# Patient Record
Sex: FEMALE | Race: WHITE | NOT HISPANIC OR LATINO | Employment: OTHER | URBAN - METROPOLITAN AREA
[De-identification: names, ages, dates, MRNs, and addresses within clinical notes are randomized per-mention and may not be internally consistent; named-entity substitution may affect disease eponyms.]

---

## 2017-02-21 ENCOUNTER — HOSPITAL ENCOUNTER (EMERGENCY)
Facility: OTHER | Age: 77
Discharge: HOME | End: 2017-02-21
Attending: EMERGENCY MEDICINE
Payer: MEDICARE

## 2017-02-21 VITALS
OXYGEN SATURATION: 96 % | RESPIRATION RATE: 16 BRPM | BODY MASS INDEX: 30.32 KG/M2 | SYSTOLIC BLOOD PRESSURE: 150 MMHG | TEMPERATURE: 97 F | WEIGHT: 182 LBS | HEIGHT: 65 IN | DIASTOLIC BLOOD PRESSURE: 72 MMHG | HEART RATE: 57 BPM

## 2017-02-21 DIAGNOSIS — R53.81 MALAISE: Primary | ICD-10-CM

## 2017-02-21 DIAGNOSIS — R42 LIGHTHEADEDNESS: ICD-10-CM

## 2017-02-21 LAB
ALBUMIN SERPL BCP-MCNC: 3.5 G/DL
ALP SERPL-CCNC: 84 U/L
ALT SERPL W/O P-5'-P-CCNC: 30 U/L
ANION GAP SERPL CALC-SCNC: 11 MMOL/L
AST SERPL-CCNC: 32 U/L
BACTERIA #/AREA URNS HPF: ABNORMAL /HPF
BASOPHILS # BLD AUTO: 0.01 K/UL
BASOPHILS NFR BLD: 0.1 %
BILIRUB SERPL-MCNC: 0.4 MG/DL
BILIRUB UR QL STRIP: NEGATIVE
BUN SERPL-MCNC: 17 MG/DL
CALCIUM SERPL-MCNC: 9.3 MG/DL
CHLORIDE SERPL-SCNC: 95 MMOL/L
CLARITY UR: ABNORMAL
CO2 SERPL-SCNC: 29 MMOL/L
COLOR UR: YELLOW
CREAT SERPL-MCNC: 0.9 MG/DL
DIFFERENTIAL METHOD: ABNORMAL
EOSINOPHIL # BLD AUTO: 0.1 K/UL
EOSINOPHIL NFR BLD: 1.3 %
ERYTHROCYTE [DISTWIDTH] IN BLOOD BY AUTOMATED COUNT: 13.2 %
EST. GFR  (AFRICAN AMERICAN): >60 ML/MIN/1.73 M^2
EST. GFR  (NON AFRICAN AMERICAN): >60 ML/MIN/1.73 M^2
GLUCOSE SERPL-MCNC: 108 MG/DL
GLUCOSE UR QL STRIP: NEGATIVE
HCT VFR BLD AUTO: 41.2 %
HGB BLD-MCNC: 13.8 G/DL
HGB UR QL STRIP: NEGATIVE
KETONES UR QL STRIP: ABNORMAL
LEUKOCYTE ESTERASE UR QL STRIP: ABNORMAL
LYMPHOCYTES # BLD AUTO: 1.9 K/UL
LYMPHOCYTES NFR BLD: 17.6 %
MCH RBC QN AUTO: 32.1 PG
MCHC RBC AUTO-ENTMCNC: 33.5 %
MCV RBC AUTO: 96 FL
MICROSCOPIC COMMENT: ABNORMAL
MONOCYTES # BLD AUTO: 0.9 K/UL
MONOCYTES NFR BLD: 8.1 %
NEUTROPHILS # BLD AUTO: 7.8 K/UL
NEUTROPHILS NFR BLD: 72.4 %
NITRITE UR QL STRIP: NEGATIVE
PH UR STRIP: 7 [PH] (ref 5–8)
PLATELET # BLD AUTO: 228 K/UL
PMV BLD AUTO: 11 FL
POTASSIUM SERPL-SCNC: 3.9 MMOL/L
PROT SERPL-MCNC: 7 G/DL
PROT UR QL STRIP: NEGATIVE
RBC # BLD AUTO: 4.3 M/UL
SODIUM SERPL-SCNC: 135 MMOL/L
SP GR UR STRIP: 1.01 (ref 1–1.03)
SQUAMOUS #/AREA URNS HPF: 13 /HPF
URN SPEC COLLECT METH UR: ABNORMAL
UROBILINOGEN UR STRIP-ACNC: NEGATIVE EU/DL
WBC # BLD AUTO: 10.81 K/UL
WBC #/AREA URNS HPF: 4 /HPF (ref 0–5)

## 2017-02-21 PROCEDURE — 93010 ELECTROCARDIOGRAM REPORT: CPT | Mod: ,,, | Performed by: INTERNAL MEDICINE

## 2017-02-21 PROCEDURE — 99284 EMERGENCY DEPT VISIT MOD MDM: CPT | Mod: 25

## 2017-02-21 PROCEDURE — 96360 HYDRATION IV INFUSION INIT: CPT

## 2017-02-21 PROCEDURE — 80053 COMPREHEN METABOLIC PANEL: CPT

## 2017-02-21 PROCEDURE — 25000003 PHARM REV CODE 250: Performed by: EMERGENCY MEDICINE

## 2017-02-21 PROCEDURE — 85025 COMPLETE CBC W/AUTO DIFF WBC: CPT

## 2017-02-21 PROCEDURE — 81000 URINALYSIS NONAUTO W/SCOPE: CPT

## 2017-02-21 PROCEDURE — 93005 ELECTROCARDIOGRAM TRACING: CPT

## 2017-02-21 RX ORDER — LOPERAMIDE HCL 2 MG
2 TABLET ORAL 4 TIMES DAILY PRN
COMMUNITY

## 2017-02-21 RX ORDER — CITALOPRAM 10 MG/1
10 TABLET ORAL DAILY
Status: ON HOLD | COMMUNITY
End: 2018-11-15

## 2017-02-21 RX ORDER — ROSUVASTATIN CALCIUM 5 MG/1
10 TABLET, COATED ORAL
COMMUNITY

## 2017-02-21 RX ORDER — BUPROPION HYDROCHLORIDE 150 MG/1
150 TABLET, EXTENDED RELEASE ORAL DAILY
COMMUNITY
End: 2021-02-15 | Stop reason: CLARIF

## 2017-02-21 RX ORDER — PROPRANOLOL HYDROCHLORIDE 60 MG/1
60 TABLET ORAL DAILY
COMMUNITY
End: 2018-11-09 | Stop reason: CLARIF

## 2017-02-21 RX ORDER — MONTELUKAST SODIUM 10 MG/1
10 TABLET ORAL NIGHTLY
COMMUNITY

## 2017-02-21 RX ADMIN — SODIUM CHLORIDE 1000 ML: 0.9 INJECTION, SOLUTION INTRAVENOUS at 07:02

## 2017-02-21 NOTE — ED AVS SNAPSHOT
[facility]  [address]  [place] [address]               Emerita Escudero   2017  6:01 PM   ED    Description:  Female : 1940   Department:  [facility]           Your Care was Coordinated By:     Provider Role From To    Leighton Teran II, MD Attending Provider 17 0540 --      Reason for Visit     Abnormal ECG           Diagnoses this Visit        Comments    Malaise    -  Primary     Lightheadedness           ED Disposition     None           To Do List           Follow-up Information     Follow up with Your MD at [facility] In 3 days.      [facility]     [facility] -  Assistance  Registered nurses in the [facility] provide clinical advisement, health education, appointment booking, and other advisory services.  Call for this free service at [phone].             Medications           Message regarding Medications     Verify the changes and/or additions to your medication regime listed below are the same as discussed with your clinician today.  If any of these changes or additions are incorrect, please notify your healthcare provider.        These medications were administered today        Dose Freq    sodium chloride 0.9% bolus 1,000 mL 1,000 mL Once    Sig: Inject 1,000 mLs into the vein once.    Class: Normal    Route: Intravenous           Verify that the below list of medications is an accurate representation of the medications you are currently taking.  If none reported, the list may be blank. If incorrect, please contact your healthcare provider. Carry this list with you in case of emergency.           Current Medications     buPROPion (WELLBUTRIN SR) 150 MG TBSR 12 hr tablet Take 150 mg by mouth once daily.    citalopram (CELEXA) 10 MG tablet Take 10 mg by mouth once daily.    loperamide (IMODIUM A-D) 2 mg Tab Take 2 mg by mouth 4 (four) times daily as needed.    montelukast  "(SINGULAIR) 10 mg tablet Take 10 mg by mouth every evening.    propranolol (INDERAL) 60 MG tablet Take 60 mg by mouth once daily.    rosuvastatin (CRESTOR) 5 MG tablet Take 5 mg by mouth once daily. 1/2 tablet for 5 days a week, 1 tab twice a week    THYROID,PORK (ARMOUR THYROID ORAL) Take by mouth once daily.           Clinical Reference Information           Your Vitals Were     BP Pulse Temp Resp Height Weight    178/98 (BP Location: Left arm, Patient Position: Sitting) 66 97.3 °F (36.3 °C) (Oral) 16 5' 5" (1.651 m) 82.6 kg (182 lb)    SpO2 BMI             96% 30.29 kg/m2         Allergies as of 2/21/2017     No Known Allergies      Immunizations Administered on Date of Encounter - 2/21/2017     None      ED Micro, Lab, POCT     Start Ordered       Status Ordering Provider    02/21/17 1822 02/21/17 1821  Urinalysis - Clean Catch  STAT      Final result     02/21/17 1821 02/21/17 1821  Urinalysis Microscopic  Once      Final result     02/21/17 1818 02/21/17 1821  CBC auto differential  STAT      Final result     02/21/17 1818 02/21/17 1821  Comprehensive metabolic panel  STAT      Final result       ED Imaging Orders     None      Discharge References/Attachments     DIZZINESS, UNCERTAIN CAUSE (ENGLISH)      [facility] Sign-Up     Activating your [facility] account is as easy as 1-2-3!     1) Visit [email or web address], select Sign Up Now, enter this activation code and your date of birth, then select Next.  [email or web address]  Expires: 4/7/2017  8:22 PM      2) Create a username and password to use when you visit [facility] in the future and select a security question in case you lose your password and select Next.    3) Enter your e-mail address and click Sign Up!    Additional Information  If you have questions, please e-mail [email or web address] or call [phone] to talk to our [facility] staff. Remember, [facility] is NOT to be used for urgent needs. For medical emergencies, dial 911.          [facility] complies with applicable Federal civil rights laws and does not discriminate on the basis of race, color, national origin, age, disability, or sex.        Language Assistance Services     ATTENTION: Language assistance services are available, free of charge. Please call [phone].      ATENCIÓN: Si habla español, tiene a daugherty disposición servicios gratuitos de asistencia lingüística. Llame al [phone].     CHÚ Ý: N?u b?n nói Ti?ng Vi?t, có các d?ch v? h? tr? ngôn ng? mi?n phí dành cho b?n. G?i s? [phone].

## 2017-02-22 NOTE — ED PROVIDER NOTES
"Encounter Date: 2/21/2017    SCRIBE #1 NOTE: I, Sommer Romero, am scribing for, and in the presence of,  Dr. Teran. I have scribed the entire note.       History     Chief Complaint   Patient presents with    Abnormal ECG     Pt states," Dr. High sent me to have another EKG done, she said something didn't octaviano rigth on it". Pt reprots + fatigue. Pt denies chest pain, SOB, N/V/D.      Review of patient's allergies indicates:  No Known Allergies  HPI Comments: Time seen by provider: 6:12 PM    This is a 76 y.o. female who presents with complaint of light headedness. She reports onset of symptoms was several days ago. The patient notes prior to onset of symptoms she was feeling bad with cold symptoms for about 2 weeks. She states her doctor prescribed antibiotics and prednisone. The patient notes she completed the Z-pack and finished the steroids 2 days ago. She states she was still feeling bad and went to see her doctor today.  The patient reports she had an EKG which her doctor noted was abnormal. She states the doctor wanted her to come to ED for evaluation. As per spouse the patient was noted to have elevated pressure with lying down and decreased pressure with standing. The patient denies any associated chest pain, palpitations, SOB, nausea, vomiting, fever or chills.     The history is provided by the patient.     History reviewed. No pertinent past medical history.  No past medical history pertinent negatives.  Past Surgical History   Procedure Laterality Date    Cholecystectomy      Tonsillectomy       History reviewed. No pertinent family history.  Social History   Substance Use Topics    Smoking status: Never Smoker    Smokeless tobacco: None    Alcohol use Yes      Comment: socially     Review of Systems   Constitutional: Negative for chills and fever.   HENT: Negative for congestion and sore throat.    Eyes: Negative for redness and visual disturbance.   Respiratory: Negative for cough and " shortness of breath.    Cardiovascular: Negative for chest pain and palpitations.   Gastrointestinal: Negative for abdominal pain, diarrhea, nausea and vomiting.   Genitourinary: Negative for dysuria.   Musculoskeletal: Negative for back pain.   Skin: Negative for rash.   Neurological: Positive for light-headedness. Negative for weakness and headaches.   Psychiatric/Behavioral: Negative for confusion.       Physical Exam   Initial Vitals   BP Pulse Resp Temp SpO2   02/21/17 1613 02/21/17 1613 02/21/17 1613 02/21/17 1613 02/21/17 1613   178/98 66 16 97.3 °F (36.3 °C) 96 %     Physical Exam    Nursing note and vitals reviewed.  Constitutional: She appears well-developed and well-nourished. She is not diaphoretic. No distress.   HENT:   Head: Normocephalic and atraumatic.   Right Ear: External ear normal.   Left Ear: External ear normal.   Dry mucus membranes   Eyes: EOM are normal.   No pallor or icterus   Neck: Normal range of motion. Neck supple.   Cardiovascular: Normal rate, regular rhythm and normal heart sounds. Exam reveals no gallop and no friction rub.    No murmur heard.  Pulmonary/Chest: Breath sounds normal. She has no wheezes. She has no rhonchi. She has no rales.   Abdominal: Soft. Bowel sounds are normal. There is no tenderness. There is no rebound and no guarding.   Musculoskeletal: Normal range of motion. She exhibits no edema or tenderness.   Lymphadenopathy:     She has no cervical adenopathy.   Neurological: She is alert and oriented to person, place, and time. She has normal strength.   Skin: Skin is warm and dry. No rash noted.         ED Course   Procedures  Labs Reviewed   CBC W/ AUTO DIFFERENTIAL - Abnormal; Notable for the following:        Result Value    MCH 32.1 (*)     Gran # 7.8 (*)     Lymph% 17.6 (*)     All other components within normal limits   COMPREHENSIVE METABOLIC PANEL - Abnormal; Notable for the following:     Sodium 135 (*)     All other components within normal limits    URINALYSIS - Abnormal; Notable for the following:     Appearance, UA Hazy (*)     Ketones, UA Trace (*)     Leukocytes, UA Trace (*)     All other components within normal limits   URINALYSIS MICROSCOPIC - Abnormal; Notable for the following:     Bacteria, UA Few (*)     All other components within normal limits     EKG Readings: (Independently Interpreted)   EKG Reading (6:14 PM): Sinus rhythm with a rate of 65. No ST or T wave changes. No STEMI        Medical Decision Making:   Independently Interpreted Test(s):   I have ordered and independently interpreted EKG Reading(s) - see prior notes  Clinical Tests:   Lab Tests: Ordered and Reviewed  Medical Tests: Reviewed and Ordered    Additional MDM:   EKG: I have independently interpreted EKG(s) - see notes.          Scribe Attestation:   Scribe #1: I performed the above scribed service and the documentation accurately describes the services I performed. I attest to the accuracy of the note.    Attending Attestation:           Physician Attestation for Scribe:  Physician Attestation Statement for Scribe #1: I, Dr. Teran, reviewed documentation, as scribed by Sommer Romero in my presence, and it is both accurate and complete.           Patient presents referred from her doctor's office due to ongoing malaise, lightheadedness, after recent treatment for bronchitis.  Patient finished a Z-Rainer and prednisone.  She complains any evidence of occasional lightheadedness with standing, thirst and decreased urination.  On exam other than mildly dry mucous membranes she appeared well.  There is also some question of EKG abnormality at the referring office however reviewed that shows motion artifact and repeat EKG here does not show any acute changes laboratory studies unremarkable without significant dehydration.  Patient feels better after fluids.  No evidence of UTI.  Her lungs are clear and she is afebrile I do not think she needs repeat chest x-ray.  Recommend adequate  hydration at home.  Stable for discharge.  Follow-up with primary care, especially if symptoms persist.          ED Course     Clinical Impression:     1. Malaise    2. Lightheadedness                Leighton Teran II, MD  02/21/17 2028

## 2017-02-22 NOTE — ED TRIAGE NOTES
Pt states she was sent to ER by primary care because of abnormal EKG.  Pt states she was clammy in the office.  Pt's  reports pt's BP dropped when she stood up.

## 2018-10-26 NOTE — H&P
Subjective:     Status post prior left Shackelford  Replacement.  Did well until recently.  Progressive arthritic complaints.  Admitted for conversion to left knee replacement    There are no active problems to display for this patient.    No past medical history on file.   Past Surgical History:   Procedure Laterality Date    CHOLECYSTECTOMY      TONSILLECTOMY        No medications prior to admission.     Review of patient's allergies indicates:  No Known Allergies   Social History     Tobacco Use    Smoking status: Never Smoker   Substance Use Topics    Alcohol use: Yes     Comment: socially      No family history on file.   Review of Systems  Pertinent items are noted in HPI.    Objective:     No data found.  Heart regular lungs clear crepitance tenderness left knee good stability    Imaging Review  Prior Shackelford replacement with degenerative changes lateral compartment    Assessment:     There are no hospital problems to display for this patient.      Plan:     The various methods of treatment have been discussed with the patient and family.   After consideration of risks, benefits and other options for treatment, the patient has consented to surgical interventions (Significant risks discussed).  Questions were answered and Pre-op teaching was done by me.

## 2018-11-07 ENCOUNTER — TELEPHONE (OUTPATIENT)
Dept: MEDSURG UNIT | Facility: OTHER | Age: 78
End: 2018-11-07

## 2018-11-09 ENCOUNTER — ANESTHESIA EVENT (OUTPATIENT)
Dept: SURGERY | Facility: OTHER | Age: 78
DRG: 467 | End: 2018-11-09
Payer: MEDICARE

## 2018-11-09 ENCOUNTER — HOSPITAL ENCOUNTER (OUTPATIENT)
Dept: PREADMISSION TESTING | Facility: OTHER | Age: 78
Discharge: HOME | End: 2018-11-09
Attending: ORTHOPAEDIC SURGERY
Payer: MEDICARE

## 2018-11-09 VITALS
HEIGHT: 64 IN | HEART RATE: 65 BPM | SYSTOLIC BLOOD PRESSURE: 180 MMHG | OXYGEN SATURATION: 97 % | DIASTOLIC BLOOD PRESSURE: 86 MMHG | WEIGHT: 193 LBS | TEMPERATURE: 98 F | BODY MASS INDEX: 32.95 KG/M2

## 2018-11-09 LAB
BACTERIA #/AREA URNS HPF: ABNORMAL /HPF
BILIRUB UR QL STRIP: NEGATIVE
CLARITY UR: ABNORMAL
COLOR UR: YELLOW
GLUCOSE UR QL STRIP: NEGATIVE
HGB UR QL STRIP: NEGATIVE
KETONES UR QL STRIP: NEGATIVE
LEUKOCYTE ESTERASE UR QL STRIP: ABNORMAL
MICROSCOPIC COMMENT: ABNORMAL
NITRITE UR QL STRIP: NEGATIVE
PH UR STRIP: 7 [PH] (ref 5–8)
PROT UR QL STRIP: NEGATIVE
SP GR UR STRIP: 1.01 (ref 1–1.03)
SQUAMOUS #/AREA URNS HPF: 3 /HPF
URN SPEC COLLECT METH UR: ABNORMAL
UROBILINOGEN UR STRIP-ACNC: NEGATIVE EU/DL
WBC #/AREA URNS HPF: 5 /HPF (ref 0–5)

## 2018-11-09 PROCEDURE — 81000 URINALYSIS NONAUTO W/SCOPE: CPT

## 2018-11-09 RX ORDER — VALSARTAN AND HYDROCHLOROTHIAZIDE 160; 25 MG/1; MG/1
1 TABLET ORAL DAILY
Status: ON HOLD | COMMUNITY
End: 2021-02-19 | Stop reason: HOSPADM

## 2018-11-09 RX ORDER — ESZOPICLONE 1 MG/1
3 TABLET, FILM COATED ORAL NIGHTLY PRN
COMMUNITY

## 2018-11-09 RX ORDER — SODIUM CHLORIDE, SODIUM LACTATE, POTASSIUM CHLORIDE, CALCIUM CHLORIDE 600; 310; 30; 20 MG/100ML; MG/100ML; MG/100ML; MG/100ML
INJECTION, SOLUTION INTRAVENOUS CONTINUOUS
Status: CANCELLED | OUTPATIENT
Start: 2018-11-09

## 2018-11-09 RX ORDER — FLUTICASONE PROPIONATE AND SALMETEROL 250; 50 UG/1; UG/1
1 POWDER RESPIRATORY (INHALATION) 2 TIMES DAILY
COMMUNITY

## 2018-11-09 RX ORDER — LIDOCAINE HYDROCHLORIDE 10 MG/ML
0.5 INJECTION, SOLUTION EPIDURAL; INFILTRATION; INTRACAUDAL; PERINEURAL ONCE
Status: CANCELLED | OUTPATIENT
Start: 2018-11-09 | End: 2018-11-09

## 2018-11-09 RX ORDER — METOPROLOL SUCCINATE 25 MG/1
25 TABLET, EXTENDED RELEASE ORAL NIGHTLY
COMMUNITY
End: 2021-02-15 | Stop reason: CLARIF

## 2018-11-09 RX ORDER — CHOLECALCIFEROL (VITAMIN D3) 25 MCG
1000 TABLET ORAL DAILY
COMMUNITY

## 2018-11-09 RX ORDER — FLUCONAZOLE 100 MG/1
100 TABLET ORAL DAILY
Status: ON HOLD | COMMUNITY
End: 2018-11-15

## 2018-11-09 NOTE — PRE ADMISSION SCREENING
"Dr. Hurley, anesthesia, request copies of stress test and echo for patient's MD in [place].  Patient states she will obtain and fax to our office.  Dr. Hurley saw outside labs, EKG, brought in by patient.  Patient states she cannot give urine specimen secondary to "shy bladder"  Patient will collect at home and bring in.  "

## 2018-11-09 NOTE — NURSING
Total Joint Replacement Questionnaire     Emerita Escudero participated in Joint Class Nov 9    1. Have you ever had a Joint Replacement?   [x]Yes  [] No    2. How many stairs/steps do you have to enter your home? 2 to get into bed  When going up, on what side is the railing?  [] Left  [] Right  [] Bilateral  [x] None    3. Do you own any Durable Medical Equipment?   [] Rolling Walker  [] Standard Walker  [] Rollator  [x] Cane  [] Crutches  [x] Bed Side Commode  [] Hip Kit  [] Tub Transfer Bench  [] Shower Chair     4. Have you used a Home Health Company before?   [x] Yes  [] No  If Yes, Name of Company: Family  Would you like to use this company again?   [x] Yes  [] No  [x] Would you like to use your physician's preference?  [x] Yes  [] No    5. Have you arranged for someone to help you, for at least for 3 days, when you are discharged from the hospital?  [x] Yes  [] No  If Yes, Name(s) of person assisting: spouse      Marilia Herndon  11/9/2018

## 2018-11-09 NOTE — ANESTHESIA PREPROCEDURE EVALUATION
11/09/2018  Emerita Escudero is a 78 y.o., female.    Anesthesia Evaluation    I have reviewed the Patient Summary Reports.    I have reviewed the Nursing Notes.   I have reviewed the Medications.     Review of Systems  Anesthesia Hx:  Denies Family Hx of Anesthesia complications.  Personal Hx of Anesthesia complications Slow To Awaken/Delayed Emergence (remote hx, recent anesthetics ok)   Social:  Non-Smoker    Hematology/Oncology:  Hematology Normal   Oncology Normal     EENT/Dental:   chronic allergic rhinitis   Cardiovascular:   Hypertension Valvular problems/Murmurs, AS hyperlipidemia ECG has been reviewed. Recent stress echo,    Pulmonary:   Asthma (controlled advair/singulair, rare rescue inhaler use) Recent URI, resolved    Renal/:  Renal/ Normal     Hepatic/GI:  Hepatic/GI Normal Diflucan for thrush (developed after antibx for URI)   Musculoskeletal:  Spine Disorders: cervical and lumbar Chronic Pain    Neurological:  Neurology Normal Essential tremor on metoprolo   Endocrine:   Hypothyroidism    Dermatological:  Skin Normal    Psych:  Psychiatric Normal           Physical Exam  General:  Well nourished    Airway/Jaw/Neck:  Airway Findings: Mouth Opening: Normal Mallampati: II  TM Distance: Normal, at least 6 cm  Jaw/Neck Findings:  Neck ROM: Extension Decreased, Mod., Extension Painful      Dental:  Dental Findings: In tact, molar caps        Mental Status:  Mental Status Findings:  Cooperative, Alert and Oriented         Anesthesia Plan  Type of Anesthesia, risks & benefits discussed:  Anesthesia Type:  spinal  Patient's Preference:   Intra-op Monitoring Plan: standard ASA monitors  Intra-op Monitoring Plan Comments:   Post Op Pain Control Plan: per primary service following discharge from PACU  Post Op Pain Control Plan Comments:   Induction:    Beta Blocker:         Informed Consent:  Patient understands risks and agrees with Anesthesia plan.  Questions answered. Anesthesia consent signed with patient.  ASA Score: 2     Day of Surgery Review of History & Physical:    H&P update referred to the surgeon.     Anesthesia Plan Notes: Pt just moved here from [place], full w/u, labs, and eval on paper chart    Pt to check with Dr. Xiao re current tx for thrush        Ready For Surgery From Anesthesia Perspective.

## 2018-11-09 NOTE — PRE ADMISSION SCREENING
Pt reports taking Diflucan for thrush.  States last dose will be day before scheduled surgery for TKA, Dr. Xiao, on 11/15/18.  Spoke with Danita, 's assistant, regarding same.  Dr. Xiao called patient and spoke with her.

## 2018-11-15 ENCOUNTER — ANESTHESIA (OUTPATIENT)
Dept: SURGERY | Facility: OTHER | Age: 78
DRG: 467 | End: 2018-11-15
Payer: MEDICARE

## 2018-11-15 ENCOUNTER — HOSPITAL ENCOUNTER (INPATIENT)
Facility: OTHER | Age: 78
LOS: 1 days | Discharge: HOME HEALTH | DRG: 467 | End: 2018-11-16
Attending: ORTHOPAEDIC SURGERY | Admitting: ORTHOPAEDIC SURGERY
Payer: MEDICARE

## 2018-11-15 DIAGNOSIS — T84.093A FAILED TOTAL LEFT KNEE REPLACEMENT: ICD-10-CM

## 2018-11-15 PROCEDURE — 97530 THERAPEUTIC ACTIVITIES: CPT

## 2018-11-15 PROCEDURE — 63600175 PHARM REV CODE 636 W HCPCS: Performed by: ORTHOPAEDIC SURGERY

## 2018-11-15 PROCEDURE — 25000003 PHARM REV CODE 250: Performed by: ANESTHESIOLOGY

## 2018-11-15 PROCEDURE — 36000710: Performed by: ORTHOPAEDIC SURGERY

## 2018-11-15 PROCEDURE — G8978 MOBILITY CURRENT STATUS: HCPCS | Mod: CI

## 2018-11-15 PROCEDURE — 71000039 HC RECOVERY, EACH ADD'L HOUR: Performed by: ORTHOPAEDIC SURGERY

## 2018-11-15 PROCEDURE — 71000033 HC RECOVERY, INTIAL HOUR: Performed by: ORTHOPAEDIC SURGERY

## 2018-11-15 PROCEDURE — 25000003 PHARM REV CODE 250: Performed by: ORTHOPAEDIC SURGERY

## 2018-11-15 PROCEDURE — 63600175 PHARM REV CODE 636 W HCPCS: Performed by: NURSE ANESTHETIST, CERTIFIED REGISTERED

## 2018-11-15 PROCEDURE — 94761 N-INVAS EAR/PLS OXIMETRY MLT: CPT

## 2018-11-15 PROCEDURE — G8979 MOBILITY GOAL STATUS: HCPCS | Mod: CJ

## 2018-11-15 PROCEDURE — C1713 ANCHOR/SCREW BN/BN,TIS/BN: HCPCS | Performed by: ORTHOPAEDIC SURGERY

## 2018-11-15 PROCEDURE — 37000008 HC ANESTHESIA 1ST 15 MINUTES: Performed by: ORTHOPAEDIC SURGERY

## 2018-11-15 PROCEDURE — C9290 INJ, BUPIVACAINE LIPOSOME: HCPCS | Performed by: ORTHOPAEDIC SURGERY

## 2018-11-15 PROCEDURE — 27201423 OPTIME MED/SURG SUP & DEVICES STERILE SUPPLY: Performed by: ORTHOPAEDIC SURGERY

## 2018-11-15 PROCEDURE — 97110 THERAPEUTIC EXERCISES: CPT

## 2018-11-15 PROCEDURE — 0SRD0J9 REPLACEMENT OF LEFT KNEE JOINT WITH SYNTHETIC SUBSTITUTE, CEMENTED, OPEN APPROACH: ICD-10-PCS | Performed by: ORTHOPAEDIC SURGERY

## 2018-11-15 PROCEDURE — 36000711: Performed by: ORTHOPAEDIC SURGERY

## 2018-11-15 PROCEDURE — 25000003 PHARM REV CODE 250: Performed by: NURSE ANESTHETIST, CERTIFIED REGISTERED

## 2018-11-15 PROCEDURE — 0SPD0JZ REMOVAL OF SYNTHETIC SUBSTITUTE FROM LEFT KNEE JOINT, OPEN APPROACH: ICD-10-PCS | Performed by: ORTHOPAEDIC SURGERY

## 2018-11-15 PROCEDURE — C1776 JOINT DEVICE (IMPLANTABLE): HCPCS | Performed by: ORTHOPAEDIC SURGERY

## 2018-11-15 PROCEDURE — 11000001 HC ACUTE MED/SURG PRIVATE ROOM

## 2018-11-15 PROCEDURE — 99900035 HC TECH TIME PER 15 MIN (STAT)

## 2018-11-15 PROCEDURE — 97116 GAIT TRAINING THERAPY: CPT

## 2018-11-15 PROCEDURE — 63600175 PHARM REV CODE 636 W HCPCS: Performed by: SPECIALIST

## 2018-11-15 PROCEDURE — 37000009 HC ANESTHESIA EA ADD 15 MINS: Performed by: ORTHOPAEDIC SURGERY

## 2018-11-15 PROCEDURE — 97161 PT EVAL LOW COMPLEX 20 MIN: CPT

## 2018-11-15 PROCEDURE — 94799 UNLISTED PULMONARY SVC/PX: CPT

## 2018-11-15 DEVICE — IMPLANTABLE DEVICE
Type: IMPLANTABLE DEVICE | Site: KNEE | Status: FUNCTIONAL
Brand: VANGUARD® KNEE SYSTEM

## 2018-11-15 DEVICE — CEMENT REFOBACIN BCR 1X40: Type: IMPLANTABLE DEVICE | Site: KNEE | Status: FUNCTIONAL

## 2018-11-15 DEVICE — IMPLANTABLE DEVICE
Type: IMPLANTABLE DEVICE | Site: KNEE | Status: FUNCTIONAL
Brand: VANGUARD KNEE SYSTEM

## 2018-11-15 DEVICE — IMPLANTABLE DEVICE
Type: IMPLANTABLE DEVICE | Site: KNEE | Status: FUNCTIONAL
Brand: BIOMET® KNEE SYSTEM

## 2018-11-15 RX ORDER — LIDOCAINE HYDROCHLORIDE 10 MG/ML
0.5 INJECTION, SOLUTION EPIDURAL; INFILTRATION; INTRACAUDAL; PERINEURAL ONCE
Status: DISCONTINUED | OUTPATIENT
Start: 2018-11-15 | End: 2018-11-15 | Stop reason: HOSPADM

## 2018-11-15 RX ORDER — BUPROPION HYDROCHLORIDE 150 MG/1
150 TABLET, EXTENDED RELEASE ORAL DAILY
Status: DISCONTINUED | OUTPATIENT
Start: 2018-11-15 | End: 2018-11-16 | Stop reason: HOSPADM

## 2018-11-15 RX ORDER — THYROID 30 MG/1
120 TABLET ORAL DAILY
Status: DISCONTINUED | OUTPATIENT
Start: 2018-11-15 | End: 2018-11-16 | Stop reason: HOSPADM

## 2018-11-15 RX ORDER — HYDROCHLOROTHIAZIDE 25 MG/1
25 TABLET ORAL DAILY
Status: DISCONTINUED | OUTPATIENT
Start: 2018-11-15 | End: 2018-11-16 | Stop reason: HOSPADM

## 2018-11-15 RX ORDER — FAMOTIDINE 20 MG/1
20 TABLET, FILM COATED ORAL 2 TIMES DAILY
Status: DISCONTINUED | OUTPATIENT
Start: 2018-11-15 | End: 2018-11-16 | Stop reason: HOSPADM

## 2018-11-15 RX ORDER — VALSARTAN AND HYDROCHLOROTHIAZIDE 160; 25 MG/1; MG/1
1 TABLET ORAL DAILY
Status: DISCONTINUED | OUTPATIENT
Start: 2018-11-15 | End: 2018-11-15

## 2018-11-15 RX ORDER — HYDROCODONE BITARTRATE AND ACETAMINOPHEN 5; 325 MG/1; MG/1
1 TABLET ORAL EVERY 4 HOURS PRN
Status: DISCONTINUED | OUTPATIENT
Start: 2018-11-15 | End: 2018-11-16 | Stop reason: HOSPADM

## 2018-11-15 RX ORDER — MUPIROCIN 20 MG/G
OINTMENT TOPICAL
Status: DISCONTINUED | OUTPATIENT
Start: 2018-11-15 | End: 2018-11-15

## 2018-11-15 RX ORDER — HYDROMORPHONE HYDROCHLORIDE 2 MG/ML
0.4 INJECTION, SOLUTION INTRAMUSCULAR; INTRAVENOUS; SUBCUTANEOUS EVERY 5 MIN PRN
Status: DISCONTINUED | OUTPATIENT
Start: 2018-11-15 | End: 2018-11-15 | Stop reason: HOSPADM

## 2018-11-15 RX ORDER — SODIUM CHLORIDE 0.9 % (FLUSH) 0.9 %
5 SYRINGE (ML) INJECTION
Status: DISCONTINUED | OUTPATIENT
Start: 2018-11-15 | End: 2018-11-16 | Stop reason: HOSPADM

## 2018-11-15 RX ORDER — VALSARTAN 80 MG/1
160 TABLET ORAL DAILY
Status: DISCONTINUED | OUTPATIENT
Start: 2018-11-15 | End: 2018-11-16 | Stop reason: HOSPADM

## 2018-11-15 RX ORDER — MORPHINE SULFATE 4 MG/ML
4 INJECTION, SOLUTION INTRAMUSCULAR; INTRAVENOUS
Status: DISCONTINUED | OUTPATIENT
Start: 2018-11-15 | End: 2018-11-16 | Stop reason: HOSPADM

## 2018-11-15 RX ORDER — FENTANYL CITRATE 50 UG/ML
25 INJECTION, SOLUTION INTRAMUSCULAR; INTRAVENOUS EVERY 5 MIN PRN
Status: DISCONTINUED | OUTPATIENT
Start: 2018-11-15 | End: 2018-11-15 | Stop reason: HOSPADM

## 2018-11-15 RX ORDER — LIDOCAINE HCL/PF 100 MG/5ML
SYRINGE (ML) INTRAVENOUS
Status: DISCONTINUED | OUTPATIENT
Start: 2018-11-15 | End: 2018-11-15

## 2018-11-15 RX ORDER — TRANEXAMIC ACID 100 MG/ML
INJECTION, SOLUTION INTRAVENOUS
Status: DISCONTINUED | OUTPATIENT
Start: 2018-11-15 | End: 2018-11-15 | Stop reason: HOSPADM

## 2018-11-15 RX ORDER — DEXAMETHASONE SODIUM PHOSPHATE 4 MG/ML
INJECTION, SOLUTION INTRA-ARTICULAR; INTRALESIONAL; INTRAMUSCULAR; INTRAVENOUS; SOFT TISSUE
Status: DISCONTINUED | OUTPATIENT
Start: 2018-11-15 | End: 2018-11-15

## 2018-11-15 RX ORDER — HYDROCODONE BITARTRATE AND ACETAMINOPHEN 10; 325 MG/1; MG/1
1 TABLET ORAL EVERY 4 HOURS PRN
Status: DISCONTINUED | OUTPATIENT
Start: 2018-11-15 | End: 2018-11-16 | Stop reason: HOSPADM

## 2018-11-15 RX ORDER — FLUTICASONE FUROATE AND VILANTEROL 100; 25 UG/1; UG/1
1 POWDER RESPIRATORY (INHALATION) DAILY
Status: DISCONTINUED | OUTPATIENT
Start: 2018-11-15 | End: 2018-11-16 | Stop reason: HOSPADM

## 2018-11-15 RX ORDER — DEXTROSE MONOHYDRATE AND SODIUM CHLORIDE 5; .9 G/100ML; G/100ML
INJECTION, SOLUTION INTRAVENOUS CONTINUOUS
Status: DISCONTINUED | OUTPATIENT
Start: 2018-11-15 | End: 2018-11-16 | Stop reason: HOSPADM

## 2018-11-15 RX ORDER — BUPIVACAINE HCL/EPINEPHRINE 0.25-.0005
VIAL (ML) INJECTION
Status: DISCONTINUED | OUTPATIENT
Start: 2018-11-15 | End: 2018-11-15 | Stop reason: HOSPADM

## 2018-11-15 RX ORDER — OXYCODONE HYDROCHLORIDE 5 MG/1
5 TABLET ORAL
Status: DISCONTINUED | OUTPATIENT
Start: 2018-11-15 | End: 2018-11-15 | Stop reason: HOSPADM

## 2018-11-15 RX ORDER — METOPROLOL SUCCINATE 25 MG/1
25 TABLET, EXTENDED RELEASE ORAL NIGHTLY
Status: DISCONTINUED | OUTPATIENT
Start: 2018-11-15 | End: 2018-11-16 | Stop reason: HOSPADM

## 2018-11-15 RX ORDER — MIDAZOLAM HYDROCHLORIDE 1 MG/ML
INJECTION INTRAMUSCULAR; INTRAVENOUS
Status: DISCONTINUED | OUTPATIENT
Start: 2018-11-15 | End: 2018-11-15

## 2018-11-15 RX ORDER — CEFAZOLIN SODIUM 1 G/50ML
2 SOLUTION INTRAVENOUS
Status: COMPLETED | OUTPATIENT
Start: 2018-11-15 | End: 2018-11-15

## 2018-11-15 RX ORDER — ONDANSETRON 2 MG/ML
INJECTION INTRAMUSCULAR; INTRAVENOUS
Status: DISCONTINUED | OUTPATIENT
Start: 2018-11-15 | End: 2018-11-15

## 2018-11-15 RX ORDER — ROPIVACAINE HYDROCHLORIDE 5 MG/ML
INJECTION, SOLUTION EPIDURAL; INFILTRATION; PERINEURAL
Status: COMPLETED | OUTPATIENT
Start: 2018-11-15 | End: 2018-11-15

## 2018-11-15 RX ORDER — ONDANSETRON 8 MG/1
8 TABLET, ORALLY DISINTEGRATING ORAL EVERY 8 HOURS PRN
Status: DISCONTINUED | OUTPATIENT
Start: 2018-11-15 | End: 2018-11-16 | Stop reason: HOSPADM

## 2018-11-15 RX ORDER — SODIUM CHLORIDE 0.9 % (FLUSH) 0.9 %
3 SYRINGE (ML) INJECTION
Status: DISCONTINUED | OUTPATIENT
Start: 2018-11-15 | End: 2018-11-16 | Stop reason: HOSPADM

## 2018-11-15 RX ORDER — MUPIROCIN 20 MG/G
1 OINTMENT TOPICAL 2 TIMES DAILY
Status: DISCONTINUED | OUTPATIENT
Start: 2018-11-15 | End: 2018-11-16 | Stop reason: HOSPADM

## 2018-11-15 RX ORDER — MONTELUKAST SODIUM 10 MG/1
10 TABLET ORAL NIGHTLY
Status: DISCONTINUED | OUTPATIENT
Start: 2018-11-15 | End: 2018-11-16 | Stop reason: HOSPADM

## 2018-11-15 RX ORDER — ONDANSETRON 2 MG/ML
4 INJECTION INTRAMUSCULAR; INTRAVENOUS DAILY PRN
Status: DISCONTINUED | OUTPATIENT
Start: 2018-11-15 | End: 2018-11-15 | Stop reason: HOSPADM

## 2018-11-15 RX ORDER — ROSUVASTATIN CALCIUM 10 MG/1
10 TABLET, COATED ORAL NIGHTLY
Status: DISCONTINUED | OUTPATIENT
Start: 2018-11-15 | End: 2018-11-15

## 2018-11-15 RX ORDER — RAMELTEON 8 MG/1
8 TABLET ORAL NIGHTLY PRN
Status: DISCONTINUED | OUTPATIENT
Start: 2018-11-15 | End: 2018-11-16 | Stop reason: HOSPADM

## 2018-11-15 RX ORDER — CELECOXIB 200 MG/1
200 CAPSULE ORAL 2 TIMES DAILY
Status: DISCONTINUED | OUTPATIENT
Start: 2018-11-15 | End: 2018-11-16 | Stop reason: HOSPADM

## 2018-11-15 RX ORDER — CEFAZOLIN SODIUM 2 G/50ML
2 SOLUTION INTRAVENOUS
Status: COMPLETED | OUTPATIENT
Start: 2018-11-15 | End: 2018-11-16

## 2018-11-15 RX ORDER — POLYETHYLENE GLYCOL 3350 17 G/17G
17 POWDER, FOR SOLUTION ORAL DAILY
Status: DISCONTINUED | OUTPATIENT
Start: 2018-11-15 | End: 2018-11-16 | Stop reason: HOSPADM

## 2018-11-15 RX ORDER — SODIUM CHLORIDE 0.9 % (FLUSH) 0.9 %
3 SYRINGE (ML) INJECTION
Status: ACTIVE | OUTPATIENT
Start: 2018-11-15

## 2018-11-15 RX ORDER — SODIUM CHLORIDE, SODIUM LACTATE, POTASSIUM CHLORIDE, CALCIUM CHLORIDE 600; 310; 30; 20 MG/100ML; MG/100ML; MG/100ML; MG/100ML
INJECTION, SOLUTION INTRAVENOUS CONTINUOUS
Status: DISCONTINUED | OUTPATIENT
Start: 2018-11-15 | End: 2018-11-15

## 2018-11-15 RX ORDER — PROPOFOL 10 MG/ML
VIAL (ML) INTRAVENOUS CONTINUOUS PRN
Status: DISCONTINUED | OUTPATIENT
Start: 2018-11-15 | End: 2018-11-15

## 2018-11-15 RX ORDER — MEPERIDINE HYDROCHLORIDE 50 MG/ML
12.5 INJECTION INTRAMUSCULAR; INTRAVENOUS; SUBCUTANEOUS ONCE AS NEEDED
Status: DISCONTINUED | OUTPATIENT
Start: 2018-11-15 | End: 2018-11-15 | Stop reason: HOSPADM

## 2018-11-15 RX ORDER — CLONIDINE HYDROCHLORIDE 0.1 MG/1
0.1 TABLET ORAL EVERY 6 HOURS PRN
Status: DISCONTINUED | OUTPATIENT
Start: 2018-11-15 | End: 2018-11-16 | Stop reason: HOSPADM

## 2018-11-15 RX ORDER — NAPROXEN SODIUM 220 MG/1
81 TABLET, FILM COATED ORAL 2 TIMES DAILY
Status: DISCONTINUED | OUTPATIENT
Start: 2018-11-15 | End: 2018-11-16 | Stop reason: HOSPADM

## 2018-11-15 RX ORDER — ACETAMINOPHEN 10 MG/ML
INJECTION, SOLUTION INTRAVENOUS
Status: DISCONTINUED | OUTPATIENT
Start: 2018-11-15 | End: 2018-11-15

## 2018-11-15 RX ORDER — EPHEDRINE SULFATE 50 MG/ML
INJECTION, SOLUTION INTRAVENOUS
Status: DISCONTINUED | OUTPATIENT
Start: 2018-11-15 | End: 2018-11-15

## 2018-11-15 RX ORDER — PROPOFOL 10 MG/ML
VIAL (ML) INTRAVENOUS
Status: DISCONTINUED | OUTPATIENT
Start: 2018-11-15 | End: 2018-11-15

## 2018-11-15 RX ADMIN — PROPOFOL 80 MCG/KG/MIN: 10 INJECTION, EMULSION INTRAVENOUS at 07:11

## 2018-11-15 RX ADMIN — HYDROCODONE BITARTRATE AND ACETAMINOPHEN 1 TABLET: 10; 325 TABLET ORAL at 09:11

## 2018-11-15 RX ADMIN — LIDOCAINE HYDROCHLORIDE 100 MG: 20 INJECTION, SOLUTION INTRAVENOUS at 07:11

## 2018-11-15 RX ADMIN — METOPROLOL SUCCINATE 25 MG: 25 TABLET, EXTENDED RELEASE ORAL at 09:11

## 2018-11-15 RX ADMIN — ASPIRIN 81 MG CHEWABLE TABLET 81 MG: 81 TABLET CHEWABLE at 12:11

## 2018-11-15 RX ADMIN — VALSARTAN 160 MG: 80 TABLET, FILM COATED ORAL at 12:11

## 2018-11-15 RX ADMIN — EPHEDRINE SULFATE 10 MG: 50 INJECTION INTRAMUSCULAR; INTRAVENOUS; SUBCUTANEOUS at 07:11

## 2018-11-15 RX ADMIN — MONTELUKAST SODIUM 10 MG: 10 TABLET, FILM COATED ORAL at 09:11

## 2018-11-15 RX ADMIN — ONDANSETRON 8 MG: 8 TABLET, ORALLY DISINTEGRATING ORAL at 11:11

## 2018-11-15 RX ADMIN — PROPOFOL 40 MG: 10 INJECTION, EMULSION INTRAVENOUS at 07:11

## 2018-11-15 RX ADMIN — DEXTROSE AND SODIUM CHLORIDE: 5; .9 INJECTION, SOLUTION INTRAVENOUS at 10:11

## 2018-11-15 RX ADMIN — CEFAZOLIN SODIUM 2 G: 1 SOLUTION INTRAVENOUS at 07:11

## 2018-11-15 RX ADMIN — MUPIROCIN 1 G: 20 OINTMENT TOPICAL at 12:11

## 2018-11-15 RX ADMIN — DEXAMETHASONE SODIUM PHOSPHATE 8 MG: 4 INJECTION, SOLUTION INTRAMUSCULAR; INTRAVENOUS at 07:11

## 2018-11-15 RX ADMIN — CELECOXIB 200 MG: 200 CAPSULE ORAL at 12:11

## 2018-11-15 RX ADMIN — MUPIROCIN 1 G: 20 OINTMENT TOPICAL at 09:11

## 2018-11-15 RX ADMIN — EPHEDRINE SULFATE 10 MG: 50 INJECTION INTRAMUSCULAR; INTRAVENOUS; SUBCUTANEOUS at 06:11

## 2018-11-15 RX ADMIN — CELECOXIB 200 MG: 200 CAPSULE ORAL at 09:11

## 2018-11-15 RX ADMIN — ONDANSETRON 4 MG: 2 INJECTION INTRAMUSCULAR; INTRAVENOUS at 07:11

## 2018-11-15 RX ADMIN — FAMOTIDINE 20 MG: 20 TABLET, FILM COATED ORAL at 09:11

## 2018-11-15 RX ADMIN — SODIUM CHLORIDE, SODIUM LACTATE, POTASSIUM CHLORIDE, AND CALCIUM CHLORIDE: 600; 310; 30; 20 INJECTION, SOLUTION INTRAVENOUS at 06:11

## 2018-11-15 RX ADMIN — HYDROCODONE BITARTRATE AND ACETAMINOPHEN 1 TABLET: 10; 325 TABLET ORAL at 02:11

## 2018-11-15 RX ADMIN — HYDROCODONE BITARTRATE AND ACETAMINOPHEN 1 TABLET: 10; 325 TABLET ORAL at 05:11

## 2018-11-15 RX ADMIN — RAMELTEON 8 MG: 8 TABLET, FILM COATED ORAL at 09:11

## 2018-11-15 RX ADMIN — HYDROCHLOROTHIAZIDE 25 MG: 25 TABLET ORAL at 12:11

## 2018-11-15 RX ADMIN — MUPIROCIN: 20 OINTMENT TOPICAL at 06:11

## 2018-11-15 RX ADMIN — MIDAZOLAM HYDROCHLORIDE 2 MG: 1 INJECTION, SOLUTION INTRAMUSCULAR; INTRAVENOUS at 06:11

## 2018-11-15 RX ADMIN — ACETAMINOPHEN 1000 MG: 10 INJECTION, SOLUTION INTRAVENOUS at 06:11

## 2018-11-15 RX ADMIN — ROPIVACAINE HYDROCHLORIDE 3 ML: 5 INJECTION, SOLUTION EPIDURAL; INFILTRATION; PERINEURAL at 06:11

## 2018-11-15 RX ADMIN — FAMOTIDINE 20 MG: 20 TABLET, FILM COATED ORAL at 12:11

## 2018-11-15 RX ADMIN — SODIUM CHLORIDE, SODIUM LACTATE, POTASSIUM CHLORIDE, AND CALCIUM CHLORIDE: 600; 310; 30; 20 INJECTION, SOLUTION INTRAVENOUS at 07:11

## 2018-11-15 RX ADMIN — CEFAZOLIN SODIUM 2 G: 2 SOLUTION INTRAVENOUS at 03:11

## 2018-11-15 RX ADMIN — MORPHINE SULFATE 4 MG: 4 INJECTION, SOLUTION INTRAMUSCULAR; INTRAVENOUS at 03:11

## 2018-11-15 RX ADMIN — ASPIRIN 81 MG CHEWABLE TABLET 81 MG: 81 TABLET CHEWABLE at 09:11

## 2018-11-15 RX ADMIN — HYDROCODONE BITARTRATE AND ACETAMINOPHEN 1 TABLET: 5; 325 TABLET ORAL at 11:11

## 2018-11-15 NOTE — PT/OT/SLP PROGRESS
Occupational Therapy  Not Seen    Emerita Escudero   MRN: [id number]     Patient not seen for Occupational Therapy today due to ( ) departmental protocol for elective surgery patients.    Patient with Other mechanical complication of internal orthopedic device, unspecified orthopedic device type, initial encounter [T84.005O], s/p Procedure(s):  ARTHROPLASTY, KNEE 11/15/2018 who will be seen for Occupational Therapy evaluation POD#1.    Lyndsey Ragsdale OTR/L   11/15/2018 '

## 2018-11-15 NOTE — OP NOTE
[facility]  Surgery Department  Operative Note    SUMMARY     Date of Procedure: 11/15/2018     Procedure: Procedure(s) (LRB):  ARTHROPLASTY, KNEE (Left) conversion from failed Egan    Surgeon(s) and Role:     * Landon Xiao MD - Primary    Assisting Surgeon: None    Pre-Operative Diagnosis: Other mechanical complication of internal orthopedic device, unspecified orthopedic device type, initial encounter [T84.498A]    Post-Operative Diagnosis: Post-Op Diagnosis Codes:     * Other mechanical complication of internal orthopedic device, unspecified orthopedic device type, initial encounter [T84.760B]    Anesthesia:  Spinal    Technical Procedures Used:  Patient was brought to the operating room underwent spinal anesthesia without difficulty.  The left lower extremity was prepped and draped in usual fashion tourniquet applied 250 mm of mercury after Esmarch.  Using an extension of the prior incision sharp dissection was the pain down to the extensor mechanism a standard medial parapatellar arthrotomy was performed medial fat pad was removed and a medial release is performed in order to gain access to the tibia had to remove the box for.  Using osteotomes the tibial insert was removed.  Then the tibial base plate was removed.  It was well fixed.  Then attention was turned to the femur.  Again with the osteotomes the femoral side was removed.  Really did not lose much bone. So we compressed the head to a total knee.  It should be noted that she had significant amount of wear on the lateral condyle lateral plateau as well as the patella.  That was the mode of failure.  The anterior and posterior cruciate ligaments removed no lateral meniscus remained.  A tibial cut was made perpendicular to the shaft of the tibia sizing was a 71.  Attention was then turned to the femur with intramedullary guide a 5 degree provisional cut was performed.  Extension gap was about 12 on that.  That was perfect.   Sizing was a 67.  So with the tensor 1st in flexion anterior-posterior cuts performed. Bertha cuts performed.  Flexion extension gaps felt symmetric at 12.  Notch cut was performed.  With a floating trial showed 0-130 degrees range of motion rotation was marked tibia was punched prepatellar thickness 27 post patellar thickness 26 with a 34 patella.  The appropriate punch opening using good cement technique all components were cemented.  Antibiotic cement was used.  Excess cement was removed.  Pulse lavage irrigation was used. The small defect on the distal femur as well as the medial tibial plateau was filled with cement.  Again it was small.  Checked the 12 insert again. Again excellent range of motion stability.  Final 12 mm insert placed.  1.  Vicryl was used in a parapatellar arthrotomy closed in flexion post closure 0-130 degrees range of motion to 0 Vicryl subcutaneously staples in the skin. Exparel tranexamic acid and Marcaine were instilled in the soft tissues. She was placed in bulky soft dressing surgically 63 min tolerated procedure well brought to recovery in stable fashion    Description of the Findings of the Procedure:  Progression of arthritis lateral compartment failed [place]    Significant Surgical Tasks Conducted by the Assistant(s), if Applicable:  None    Complications: No    Estimated Blood Loss (EBL): * No values recorded between 11/15/2018  7:23 AM and 11/15/2018  8:34 AM *           Implants:   Implant Name Type Inv. Item Serial No.  Lot No. LRB No. Used   CEMENT REFOBACIN BCR 1X40 - [id number]  CEMENT REFOBACIN BCR 1X40  [employer] [id number] Left 2   COMP FEM POST STBL 65MM L - [id number]  COMP FEM POST STBL 65MM L  [employer] [id number] Left 1   TIBIAL PLATE     [employer] [id number] Left 1   PATELLA [id number] 34X8.5MM - [id number]  PATELLA [id number] 34X8.5MM  [employer] [id number] Left 1   TIBIAL INSRT VANGRD 12X71/75MM - [id number]  TIBIAL INSRT VANGRD 12X71/75MM  [employer]  [id number] Left 1       Specimens:   Specimen (12h ago, onward)    None                  Condition: Good    Disposition: PACU - hemodynamically stable.    Attestation: I was present and scrubbed for the entire procedure.

## 2018-11-15 NOTE — PT/OT/SLP EVAL
Physical Therapy Evaluation + Treatment    Patient Name:  Emerita Escudero   MRN:  [id number]    Recommendations:     Discharge Recommendations:  home health PT, home health OT, home with home health   Discharge Equipment Recommendations: 3-in-1 commode   Barriers to discharge: None    Assessment:     Emerita Escudero is a 78 y.o. female admitted with a medical diagnosis of Failed total left knee replacement.  She presents with the following impairments/functional limitations:  weakness, impaired functional mobilty, gait instability, impaired balance, pain, decreased ROM, decreased lower extremity function, orthopedic precautions. These impairments inhibit the patient from independently and safely participating in desired functional tasks such as transfers, ambulating within home, cooking, cleaning, painting, driving, and community ambulation.     PT evaluation complete, goals established. Patient tolerated evaluation fairly well but with significant pain noted with mobility tasks. Patient requires mod verbal cues for hand placement during bed mobility. Min A for sit <> stand while using RW. Poor eccentric control of quads upon sit from standing. Ambulated ~ 45' with RW + chair follow requiring CGA but requested to sit secondary to significant pain. Patient unwilling to ambulate further during first session. Patient did tolerate therapeutic exercises fairly well, and benefits from demonstrations. Patient would benefit from HH upon discharge in order to optimize functional mobility outcomes and maximize patient safety.    Rehab Prognosis:  Good/Excellent; patient would benefit from acute skilled PT services to address these deficits and reach maximum level of function.      Recent Surgery: Procedure(s) (LRB):  ARTHROPLASTY, KNEE (Left) Day of Surgery    Plan:     During this hospitalization, patient to be seen BID to address the above listed problems via gait training, therapeutic activities, therapeutic  "exercises  · Plan of Care Expires:  12/15/18   Plan of Care Reviewed with: patient, spouse    Subjective     Communicated with VERONICA Womack prior to session.  Patient found supine in bed upon PT entry to room, agreeable to evaluation.      Chief Complaint: Pain; dizziness upon sit to stand  Patient comments/goals: Agreeable to gait training in hallway but inquired about her clothes. Patient stated, " No, No, I really can't walk any further."    Pain/Comfort:  · Pain Rating 1: 8/10  · Location - Side 1: Left  · Location - Orientation 1: generalized  · Location 1: knee  · Pain Addressed 1: Nurse notified, Distraction, Cessation of Activity  · Pain Rating Post-Intervention 1: 6/10    Patients cultural, spiritual, Yarsanism conflicts given the current situation: none stated     Living Environment:  · Lives in [facility] with elevator assess; one step to get into elevator  · Has "step up shower" and needs to take a step to get into shower  · Lives with  who will provide   care  · Retired; enjoys painting and taking walks with her   · Mod I prior with ambulation 2/ using cane for stability purposes   · Bed is 34 inches high and requires step stool to get into   · Patient has the following equipment: walker, rolling, grab bar, cane, straight.     Objective:     Patient found with: cryotherapy, CPM, FCD     General Precautions: Standard, fall   Orthopedic Precautions:LLE weight bearing as tolerated   Braces: N/A     · Cognition:   · Patient is oriented to name, , date, place, situation.  · Pt follows approximately 100% of one step commands.    · Mood: Pleasant and cooperative. Joking throughout session.   · Musculoskeletal:  · Posture: Protective guarding surgical joint  · LE ROM/Strength: 5/5 bilateral hip flexion, nonsurgical knee extension, bilateral ankle dorsiflexion. AROM surgical knee difficult to accurately assess with large bulky dressing, although knee flexion grossly 0>90 degrees. Surgical knee " strength grossly 5/5 knee flexion and 3-/5 knee extension. Pain with LLE MMT of DF  · Neuromuscular:  · Sensation: Intact to light touch bilateral LEs. Pt denied paresthesias.   · Coordination/Tone/Reflexes: No impairments identified with functional mobility. No formal testing performed.   · Balance: CGA for dynamic standing with bilateral UE support.   · Visual-vestibular: No impairments identified with functional mobility. No formal testing performed.  · Integument:  Visible skin intact and surgical extremity dressing clean and dry.  · Cardiopulmonary:  · Vital signs:  · BP: 162/74  · HR: 71  · Edema: Mild surgical extremity.      Functional Mobility:  · Bed Mobility: HOB elevated to approx 30 degrees; verbal cues provided and noted use of handrails during bed mobility   · Scooting: stand by assistance  · Supine to Sit: stand by assistance  · Transfers: demonstration provided prior to t/f for proper use of RW including hand and feet placement   · Sit <> Stand:  minimum assistance with rolling walker  · 2x during session  · Poor eccentric control of quads upon sit   · Gait: PT demonstrated proper gait including heel contact, toe off, positioning of body in relation to RW, WB precautions, proper turning techniques regarding surgical precautions   · 45' with RW, CGA; Chair follow + IV pole  · Decreased marian  · Poor heel contact and toe off on LLE  · Decreased step length on RLE secondary to pain during stance on LLE  · Truncal flexion  · Balance:   · sitting balance: SBA  · Standing: CGA when using RW    AM-PAC 6 CLICK MOBILITY  Total Score:20       Therapeutic Activities and Exercises:  · See TA and Gait training above  · Exercises  · Pt performed supine therapeutic exercises including ankle pumps, quad sets, glute sets, SLR, heel slides, Hip ABD x 10 reps with verbal and tactile cues.     Patient left up in chair with all lines intact, call button in reach, RN notified and RN and   present.    GOALS:    Multidisciplinary Problems     Physical Therapy Goals        Problem: Physical Therapy Goal    Goal Priority Disciplines Outcome Goal Variances Interventions   Physical Therapy Goal     PT, PT/OT Ongoing (interventions implemented as appropriate)     Description:  Goals to be met by: 18    Patient will increase functional independence with mobility by performin. Supine to sit with supervision, no verbal cues for hand placement  2. Sit to supine with supervision, no cues for hand placement   3. Sit<>stand transfer with supervision using rolling walker with no verbal cues for hands/feet placement.   4. Gait > 150 feet with SBA using rolling walker with adequate L knee flexion during swing phase.   5. Ascend/descend 4 inch step with no handrails with CGA with or without AD.                      History:     Past Medical History:   Diagnosis Date    Asthma     Hypertension     Lipids abnormal     Seasonal allergies     Thyroid disease        Past Surgical History:   Procedure Laterality Date    CHOLECYSTECTOMY      COSMETIC SURGERY      face lift    DILATION AND CURETTAGE OF UTERUS      EYE SURGERY      cataract    partial knee replacement Left     TONSILLECTOMY         Clinical Decision Making:     History  Co-morbidities and personal factors that may impact the plan of care Examination  Body Structures and Functions, activity limitations and participation restrictions that may impact the plan of care Clinical Presentation   Decision Making/ Complexity Score   Co-morbidities:   [] Time since onset of injury / illness / exacerbation  [] Status of current condition  []Patient's cognitive status and safety concerns    [] Multiple Medical Problems (see med hx)  Personal Factors:   [] Patient's age  [] Prior Level of function   [] Patient's home situation (environment and family support)  [] Patient's level of motivation  [] Expected progression of patient      HISTORY:(criteria)    [] 99510 - no  personal factors/history    [] 22936 - has 1-2 personal factor/comorbidity     [] 60668 - has >3 personal factor/comorbidity     Body Regions:  [] Objective examination findings  [] Head     []  Neck  [] Trunk   [] Upper Extremity  [] Lower Extremity    Body Systems:  [] For communication ability, affect, cognition, language, and learning style: the assessment of the ability to make needs known, consciousness, orientation (person, place, and time), expected emotional /behavioral responses, and learning preferences (eg, learning barriers, education  needs)  [] For the neuromuscular system: a general assessment of gross coordinated movement (eg, balance, gait, locomotion, transfers, and transitions) and motor function  (motor control and motor learning)  [] For the musculoskeletal system: the assessment of gross symmetry, gross range of motion, gross strength, height, and weight  [] For the integumentary system: the assessment of pliability(texture), presence of scar formation, skin color, and skin integrity  [] For cardiovascular/pulmonary system: the assessment of heart rate, respiratory rate, blood pressure, and edema     Activity limitations:    [] Patient's cognitive status and saf ety concerns          [] Status of current condition      [] Weight bearing restriction  [] Cardiopulmunary Restriction    Participation Restrictions:   [] Goals and goal agreement with the patient     [] Rehab potential (prognosis) and probable outcome      Examination of Body System: (criteria)    [] 57897 - addressing 1-2 elements    [] 25529 - addressing a total of 3 or more elements     [] 73881 -  Addressing a total of 4 or more elements         Clinical Presentation: (criteria)  Choose one     On examination of body system using standardized tests and measures patient presents with (CHOOSE ONE) elements from any of the following: body structures and functions, activity limitations, and/or participation restrictions.  Leading to  a clinical presentation that is considered (CHOOSE ONE)                              Clinical Decision Making  (Eval Complexity):  Choose One     Time Tracking:     PT Received On: 11/15/18  PT Start Time: 1322     PT Stop Time: 1406  PT Total Time (min): 44 min     Billable Minutes: Evaluation 14, Gait Training 10, Therapeutic Activity 10 and Therapeutic Exercise 10      Grazyna Head, PT  11/15/2018

## 2018-11-15 NOTE — INTERVAL H&P NOTE
The patient has been examined and the H&P has been reviewed:    I concur with the findings and no changes have occurred since H&P was written.    Anesthesia/Surgery risks, benefits and alternative options discussed and understood by patient/family.          Active Hospital Problems    Diagnosis  POA    *Failed total left knee replacement [T84.095O]  Not Applicable      Resolved Hospital Problems   No resolved problems to display.

## 2018-11-15 NOTE — BRIEF OP NOTE
[facility], [place]  Brief Operative Note    SUMMARY     Surgery Date: 11/15/2018     Surgeon(s) and Role:     * Landon Xiao MD - Primary    Assisting Surgeon: None    Pre-op Diagnosis:  Other mechanical complication of internal orthopedic device, unspecified orthopedic device type, initial encounter [T84.498A]    Post-op Diagnosis:  Post-Op Diagnosis Codes:     * Other mechanical complication of internal orthopedic device, unspecified orthopedic device type, initial encounter [T84.483V]    Procedure(s) (LRB):  ARTHROPLASTY, KNEE (Left)    Anesthesia: Choice    Description of Procedure:  Failed left [place]    Description of the findings of the procedure:  Left total knee replacement/conversion Biomet vanguard    Estimated Blood Loss: * No values recorded between 11/15/2018  7:23 AM and 11/15/2018  8:32 AM *       63  Specimens:   Specimen (12h ago, onward)    None

## 2018-11-15 NOTE — PLAN OF CARE
Problem: Physical Therapy Goal  Goal: Physical Therapy Goal  Goals to be met by: 18    Patient will increase functional independence with mobility by performin. Supine to sit with supervision, no verbal cues for hand placement  2. Sit to supine with supervision, no cues for hand placement   3. Sit<>stand transfer with supervision using rolling walker with no verbal cues for hands/feet placement.   4. Gait > 150 feet with SBA using rolling walker with adequate L knee flexion during swing phase.   5. Ascend/descend 4 inch step with no handrails with CGA with or without AD.    Outcome: Ongoing (interventions implemented as appropriate)  PT evaluation complete, goals established. Patient tolerated evaluation fairly well but with significant pain noted with mobility tasks. Patient requires mod verbal cues for hand placement during bed mobility. Min A for sit <> stand while using RW. Poor eccentric control of quads upon sit from standing. Ambulated ~ 45' with RW + chair follow requiring CGA but requested to sit secondary to significant pain. Patient unwilling to ambulate further during first session. Patient did tolerate therapeutic exercises fairly well, and benefits from demonstrations. Patient would benefit from HH upon discharge in order to optimize functional mobility outcomes and maximize patient safety.

## 2018-11-15 NOTE — PLAN OF CARE
Patient accepted by [facility]      11/15/18 1103   Final Note   Assessment Type Final Discharge Note   Anticipated Discharge Disposition Home-Health   What phone number can be called within the next 1-3 days to see how you are doing after discharge? [phone]   Discharge plans and expectations educations in teach back method with documentation complete? Yes   Right Care Referral Info   Post Acute Recommendation Home-care   Facility Name [facility]

## 2018-11-15 NOTE — PT/OT/SLP PROGRESS
"Physical Therapy Treatment    Patient Name:  Emerita Escudero   MRN:  [id number]    Recommendations:     Discharge Recommendations:  home health PT, home health OT, home with home health   Discharge Equipment Recommendations: 3-in-1 commode   Barriers to discharge: None    Assessment:     Emerita Escudero is a 78 y.o. female admitted with a medical diagnosis of Failed total left knee replacement.  She presents with the following impairments/functional limitations:  weakness, impaired functional mobilty, pain, gait instability, impaired balance, decreased lower extremity function, decreased ROM, orthopedic precautions.    Patient with significant increase in pain during second session which resulted in patient refusing to ambulate. Patient was agreeable to therapeutic exercises and transfers. Required min verbal cues for there-ex and transfers. Plan to work with patient 2x tomorrow prior to d/c. Patient would benefit from HH upon discharge in order to optimize functional mobility outcomes and maximize patient safety.     Rehab Prognosis:  Good; patient would benefit from acute skilled PT services to address these deficits and reach maximum level of function.      Recent Surgery: Procedure(s) (LRB):  ARTHROPLASTY, KNEE (Left) Day of Surgery    Plan:     During this hospitalization, patient to be seen BID to address the above listed problems via gait training, therapeutic activities, therapeutic exercises  · Plan of Care Expires:  12/15/18   Plan of Care Reviewed with: patient, spouse    Subjective     Communicated with RN prior to session.  Patient found sitting in chair upon PT entry to room, agreeable to treatment.      Chief Complaint: Pain  Patient comments/goals: "I cannot walk. I really REALLY don't want to" "I will not walk again today."  Pain/Comfort:  · Pain Rating 1: 6/10  · Location - Side 1: Left  · Location - Orientation 1: generalized  · Location 1: knee  · Pain Addressed 1: Distraction, Nurse " notified  · Pain Rating Post-Intervention 1: 8/10    Patients cultural, spiritual, Baptism conflicts given the current situation: none stated     Objective:     Patient found with: cryotherapy     General Precautions: Standard, fall   Orthopedic Precautions:LLE weight bearing as tolerated   Braces: N/A     Functional Mobility  · Gait  · Spent 5-8 minutes educating patient on importance of ambulating but patient continued to refuse gait training until tomorrow.   · Bed Mobility: HOB elevated to approx 30 degrees; verbal cues provided and noted use of handrails during bed mobility   ? Sit to supine: stand by assistance  · Transfers: demonstration provided prior to t/f for proper use of RW including hand and feet placement.   ? Chair to bed: stand step with CGA using RW  ? Demonstration provided   ? Verbal cues for small steps while turning to prevnt pivoting  ? Sit <> Stand:  minimum assistance with rolling walker  ? 2x during session  ? Continued to have poor eccentric control of quads upon sit   · Balance:   ? Sitting balance: SBA  ? Standing: CGA when using RW      AM-PAC 6 CLICK MOBILITY  Turning over in bed (including adjusting bedclothes, sheets and blankets)?: 4  Sitting down on and standing up from a chair with arms (e.g., wheelchair, bedside commode, etc.): 3  Moving from lying on back to sitting on the side of the bed?: 4  Moving to and from a bed to a chair (including a wheelchair)?: 3  Need to walk in hospital room?: 3  Climbing 3-5 steps with a railing?: 3  Basic Mobility Total Score: 20       Therapeutic Activities and Exercises:  · Pt performed sitting therapeutic exercises including hip flexion, long arc quads, ankle pumps x 10 reps with verbal and visual cues.   · Pt performed supine therapeutic exercises including heel slides, SLR, quad sets, glute sets x 10 reps with verbal and tactile cues.       Patient left supine with all lines intact, call button in reach, RN notified and RN and   present..    GOALS:   Multidisciplinary Problems     Physical Therapy Goals        Problem: Physical Therapy Goal    Goal Priority Disciplines Outcome Goal Variances Interventions   Physical Therapy Goal     PT, PT/OT Ongoing (interventions implemented as appropriate)     Description:  Goals to be met by: 18    Patient will increase functional independence with mobility by performin. Supine to sit with supervision, no verbal cues for hand placement  2. Sit to supine with supervision, no cues for hand placement   3. Sit<>stand transfer with supervision using rolling walker with no verbal cues for hands/feet placement.   4. Gait > 150 feet with SBA using rolling walker with adequate L knee flexion during swing phase.   5. Ascend/descend 4 inch step with no handrails with CGA with or without AD.                      Time Tracking:     PT Received On: 11/15/18  PT Start Time: 1510     PT Stop Time: 1545  PT Total Time (min): 35 min     Billable Minutes: Therapeutic Activity 20 and Therapeutic Exercise 15    Treatment Type: Treatment  PT/PTA: PT     PTA Visit Number: 0     Grazyna Head, PT  11/15/2018

## 2018-11-15 NOTE — NURSING
Pt arrived to floor via bed with VERONICA Pollock. VS taken, IVF started, SCDs applied, oriented to room, call light placed within reach, bed low and locked, and family at bedside. Pt denies pain at this time. Aponte noted draining clear yellow urine to gravity. Incisions noted to left knee, CDI. Ice packs applied to knee. No acute distress noted at this time. Will continue to monitor.     1800   Ambulated with PT, up to chair. VSS. Pain control improving. CPM applied and tolerated well. Combipack maintained. Tolerating PO intake. Aponte care performed. Hourly rounding maintained. Patient in no distress.

## 2018-11-15 NOTE — PLAN OF CARE
Met with patient at bedside to complete discharge planning assessment. Patient is current with Dr Rose Cronin PCP & pharmacy of choice for this admission is [facility] updated. Patient's lives with her  who will provide transportation home & assist needed during recovery. Patient denied the need for any additional DME & voiced her preference for [facility] - referral forwarded via [facility]      11/15/18 1050   Discharge Assessment   Assessment Type Discharge Planning Assessment   Confirmed/corrected address and phone number on facesheet? Yes   Assessment information obtained from? Patient   Expected Length of Stay (days) 1   Communicated expected length of stay with patient/caregiver yes   Prior to hospitilization cognitive status: Alert/Oriented   Prior to hospitalization functional status: Independent   Current cognitive status: Alert/Oriented   Current Functional Status: Needs Assistance   Lives With spouse   Able to Return to Prior Arrangements yes   Is patient able to care for self after discharge? Yes   Patient's perception of discharge disposition home health   Readmission Within The Last 30 Days no previous admission in last 30 days   Patient currently being followed by outpatient case management? No   Patient currently receives any other outside agency services? No   Equipment Currently Used at Home walker, rolling;bedside commode;grab bar   Do you have any problems affording any of your prescribed medications? No   Is the patient taking medications as prescribed? yes   Does the patient have transportation home? Yes   Transportation Available family or friend will provide   Does the patient receive services at the [facility]? No   Discharge Plan A Home Health   Patient/Family In Agreement With Plan yes

## 2018-11-15 NOTE — ANESTHESIA PROCEDURE NOTES
Spinal    Diagnosis: OA L KNEE  Patient location during procedure: holding area  Start time: 11/15/2018 6:50 AM  Timeout: 11/15/2018 6:50 AM  End time: 11/15/2018 6:53 AM  Staffing  Anesthesiologist: Marco Antonio Ruggiero MD  Performed: anesthesiologist   Preanesthetic Checklist  Completed: patient identified, site marked, surgical consent, pre-op evaluation, timeout performed, IV checked, risks and benefits discussed and monitors and equipment checked  Spinal Block  Patient position: sitting  Prep: ChloraPrep  Patient monitoring: heart rate, cardiac monitor and continuous pulse ox  Approach: right paramedian  Location: L3-4  Injection technique: single shot  CSF Fluid: clear free-flowing CSF  Needle  Needle type: pencil-tip   Needle gauge: 25 G  Needle length: 3.5 in  Additional Documentation: incremental injection, negative aspiration for heme and no paresthesia on injection  Needle localization: anatomical landmarks  Assessment  Sensory level: T6   Dermatomal levels determined by alcohol wipe and pinch or prick  Ease of block: easy  Patient's tolerance of the procedure: comfortable throughout block and no complaints

## 2018-11-15 NOTE — TRANSFER OF CARE
"Anesthesia Transfer of Care Note    Patient: Emerita Escudero    Procedure(s) Performed: Procedure(s) (LRB):  ARTHROPLASTY, KNEE (Left)    Patient location: PACU    Anesthesia Type: spinal    Transport from OR: Transported from OR on 2-3 L/min O2 by NC with adequate spontaneous ventilation    Post pain: adequate analgesia    Post assessment: no apparent anesthetic complications and tolerated procedure well    Post vital signs: stable    Level of consciousness: awake, alert and oriented    Nausea/Vomiting: no nausea/vomiting    Complications: none    Transfer of care protocol was followed      Last vitals:   Visit Vitals  BP (!) 151/72 (BP Location: Left arm, Patient Position: Lying)   Pulse 66   Temp 36.4 °C (97.5 °F) (Oral)   Resp 18   Ht 5' 4" (1.626 m)   Wt 87.5 kg (192 lb 16 oz)   SpO2 98%   Breastfeeding? No   BMI 33.13 kg/m²     "

## 2018-11-15 NOTE — CONSULTS
Consult Note  Nephrology    Consult Requested By: Landon Xiao MD  Reason for Consult: HTN/thyroid/allergies    SUBJECTIVE:     History of Present Illness:  Patient is a 78 y.o. female presents with scheduled Left Knee Repair.  Seen post op.  Discussed with team.  Pre-op data/Labs reviewed.  VS noted.   at bedside.  Able to move LE minimally so far after spinal anesthesia.      Currently denies CP/SOB/N/V/D/F/C.      Labs noted.     Past Medical History:   Diagnosis Date    Asthma     Hypertension     Lipids abnormal     Seasonal allergies     Thyroid disease      Past Surgical History:   Procedure Laterality Date    CHOLECYSTECTOMY      COSMETIC SURGERY      face lift    DILATION AND CURETTAGE OF UTERUS      EYE SURGERY      cataract    partial knee replacement Left     TONSILLECTOMY       History reviewed. No pertinent family history.  Social History     Tobacco Use    Smoking status: Never Smoker    Smokeless tobacco: Never Used   Substance Use Topics    Alcohol use: Yes     Alcohol/week: 0.6 - 1.2 oz     Types: 1 - 2 Glasses of wine per week     Comment: nightly    Drug use: No       Review of patient's allergies indicates:   Allergen Reactions    Scopolamine Other (See Comments)     Became weak, speech affected    Latex, natural rubber Itching        Review of Systems:  Constitutional: No fever or chills  Respiratory: No cough or shortness of breath  Cardiovascular: No chest pain or palpitations  Gastrointestinal: No nausea or vomiting  Neurological: No confusion or weakness    OBJECTIVE:     Vital Signs (Most Recent)  Temp: 97.3 °F (36.3 °C) (11/15/18 1009)  Pulse: 67 (11/15/18 1009)  Resp: 16 (11/15/18 1009)  BP: (!) 187/83 (11/15/18 1009)  SpO2: 97 % (11/15/18 1009)    Vital Signs Range (Last 24H):  Temp:  [96.2 °F (35.7 °C)-97.5 °F (36.4 °C)]   Pulse:  [63-67]   Resp:  [16-18]   BP: (134-187)/(65-83)   SpO2:  [94 %-98 %]       Intake/Output Summary (Last 24 hours) at 11/15/2018  1014  Last data filed at 11/15/2018 0900  Gross per 24 hour   Intake 1800 ml   Output 550 ml   Net 1250 ml       Physical Exam:  General appearance: Well developed, well nourished  Eyes:  Conjunctivae/corneas clear. PERRL.  Lungs: Normal respiratory effort,   clear to auscultation bilaterally   Heart: Regular rate and rhythm, S1, S2 normal, no murmur, rub or rolanda.  Abdomen: Soft, non-tender non-distended; bowel sounds normal; no masses,  no organomegaly, obese  Extremities: No cyanosis or clubbing. No edema.    Skin: Skin color, texture, turgor normal. No rashes or lesions  Neurologic: Normal strength and tone. No focal numbness or weakness   [place]  Left Knee CDI      Laboratory:  No results for input(s): WBC, RBC, HGB, HCT, PLT, MCV, MCH, MCHC in the last 24 hours.  BMP: No results for input(s): GLU, NA, K, CL, CO2, BUN, CREATININE, CALCIUM, MG in the last 168 hours.    Invalid input(s):  PHOS  Lab Results   Component Value Date    CALCIUM 9.3 02/21/2017     BNP  No results for input(s): BNP, BNPTRIAGEBLO in the last 168 hours.No results found for: URICACIDNo results found for: IRON, TIBC, FERRITIN, SATURATEDIRO  Lab Results   Component Value Date    CALCIUM 9.3 02/21/2017       Diagnostic Results:  X-Ray Knee 1 or 2 View Left   Final Result      As above         Electronically signed by: Andi Peace   Date:    11/15/2018   Time:    09:17          ASSESSMENT/PLAN:     1. S/P Left Knee Repair (T84.093A):  Per ortho/PT/OT.  2. HTN (I10):  Restart home meds with hold parameters.  BB/ARB/HCTZ.  Clonidine PRN.    3. Hypothyroid (E03.9):  Home dose of synthroid (pork).    4. Seasonal allergies (J30.2):  Singulair and inhalers.    5. DVT Prophy:  ASA BID.     Thanks for consult  See above  Will follow for medical needs.

## 2018-11-15 NOTE — ANESTHESIA POSTPROCEDURE EVALUATION
"Anesthesia Post Evaluation    Patient: Emerita Escudero    Procedure(s) Performed: Procedure(s) (LRB):  ARTHROPLASTY, KNEE (Left)    Final Anesthesia Type: spinal  Patient location during evaluation: PACU  Patient participation: Yes- Able to Participate  Level of consciousness: awake and alert  Post-procedure vital signs: reviewed and stable  Pain management: adequate  Airway patency: patent  PONV status at discharge: No PONV  Anesthetic complications: no      Cardiovascular status: blood pressure returned to baseline  Respiratory status: unassisted and room air  Hydration status: euvolemic  Follow-up not needed.        Visit Vitals  /65 (BP Location: Left arm, Patient Position: Lying)   Pulse 67   Temp 35.7 °C (96.2 °F) (Axillary)   Resp 16   Ht 5' 4" (1.626 m)   Wt 87.5 kg (192 lb 16 oz)   SpO2 97%   Breastfeeding? No   BMI 33.13 kg/m²       Pain/Shanelle Score: Pain Assessment Performed: Yes (11/15/2018  8:36 AM)  Presence of Pain: denies (11/15/2018  9:29 AM)  Shanelle Score: 10 (11/15/2018  9:29 AM)        "

## 2018-11-16 VITALS
OXYGEN SATURATION: 95 % | WEIGHT: 193 LBS | SYSTOLIC BLOOD PRESSURE: 138 MMHG | DIASTOLIC BLOOD PRESSURE: 72 MMHG | RESPIRATION RATE: 18 BRPM | TEMPERATURE: 98 F | BODY MASS INDEX: 32.95 KG/M2 | HEART RATE: 59 BPM | HEIGHT: 64 IN

## 2018-11-16 PROBLEM — T84.093A FAILED TOTAL LEFT KNEE REPLACEMENT: Status: RESOLVED | Noted: 2018-11-15 | Resolved: 2018-11-16

## 2018-11-16 LAB
ANION GAP SERPL CALC-SCNC: 8 MMOL/L
BASOPHILS # BLD AUTO: 0.01 K/UL
BASOPHILS NFR BLD: 0 %
BUN SERPL-MCNC: 8 MG/DL
CALCIUM SERPL-MCNC: 8.8 MG/DL
CHLORIDE SERPL-SCNC: 102 MMOL/L
CO2 SERPL-SCNC: 24 MMOL/L
CREAT SERPL-MCNC: 0.7 MG/DL
DIFFERENTIAL METHOD: ABNORMAL
EOSINOPHIL # BLD AUTO: 0.1 K/UL
EOSINOPHIL NFR BLD: 0.7 %
ERYTHROCYTE [DISTWIDTH] IN BLOOD BY AUTOMATED COUNT: 12.4 %
EST. GFR  (AFRICAN AMERICAN): >60 ML/MIN/1.73 M^2
EST. GFR  (NON AFRICAN AMERICAN): >60 ML/MIN/1.73 M^2
GLUCOSE SERPL-MCNC: 126 MG/DL
HCT VFR BLD AUTO: 32.8 %
HGB BLD-MCNC: 11.2 G/DL
LYMPHOCYTES # BLD AUTO: 1.1 K/UL
LYMPHOCYTES NFR BLD: 13.4 %
MCH RBC QN AUTO: 34.4 PG
MCHC RBC AUTO-ENTMCNC: 34.1 G/DL
MCV RBC AUTO: 101 FL
MONOCYTES # BLD AUTO: 0.8 K/UL
MONOCYTES NFR BLD: 9.5 %
NEUTROPHILS # BLD AUTO: 6.3 K/UL
NEUTROPHILS NFR BLD: 76.3 %
PLATELET # BLD AUTO: 121 K/UL
PMV BLD AUTO: 12.2 FL
POTASSIUM SERPL-SCNC: 3.8 MMOL/L
RBC # BLD AUTO: 3.26 M/UL
SODIUM SERPL-SCNC: 134 MMOL/L
WBC # BLD AUTO: 8.2 K/UL

## 2018-11-16 PROCEDURE — 36415 COLL VENOUS BLD VENIPUNCTURE: CPT

## 2018-11-16 PROCEDURE — 97116 GAIT TRAINING THERAPY: CPT

## 2018-11-16 PROCEDURE — 80048 BASIC METABOLIC PNL TOTAL CA: CPT

## 2018-11-16 PROCEDURE — 97165 OT EVAL LOW COMPLEX 30 MIN: CPT

## 2018-11-16 PROCEDURE — 63600175 PHARM REV CODE 636 W HCPCS: Performed by: ORTHOPAEDIC SURGERY

## 2018-11-16 PROCEDURE — 85025 COMPLETE CBC W/AUTO DIFF WBC: CPT

## 2018-11-16 PROCEDURE — 25000003 PHARM REV CODE 250: Performed by: ORTHOPAEDIC SURGERY

## 2018-11-16 PROCEDURE — 97535 SELF CARE MNGMENT TRAINING: CPT

## 2018-11-16 PROCEDURE — 97530 THERAPEUTIC ACTIVITIES: CPT

## 2018-11-16 RX ORDER — HYDROCODONE BITARTRATE AND ACETAMINOPHEN 10; 325 MG/1; MG/1
1 TABLET ORAL EVERY 6 HOURS PRN
Qty: 50 TABLET | Refills: 0 | Status: SHIPPED | OUTPATIENT
Start: 2018-11-16 | End: 2021-02-15 | Stop reason: CLARIF

## 2018-11-16 RX ORDER — NAPROXEN SODIUM 220 MG/1
81 TABLET, FILM COATED ORAL 2 TIMES DAILY
Refills: 0 | COMMUNITY
Start: 2018-11-16 | End: 2019-11-16

## 2018-11-16 RX ADMIN — ASPIRIN 81 MG CHEWABLE TABLET 81 MG: 81 TABLET CHEWABLE at 09:11

## 2018-11-16 RX ADMIN — HYDROCODONE BITARTRATE AND ACETAMINOPHEN 1 TABLET: 10; 325 TABLET ORAL at 06:11

## 2018-11-16 RX ADMIN — VALSARTAN 160 MG: 80 TABLET, FILM COATED ORAL at 09:11

## 2018-11-16 RX ADMIN — THYROID, PORCINE 120 MG: 30 TABLET ORAL at 09:11

## 2018-11-16 RX ADMIN — POLYETHYLENE GLYCOL 3350 17 G: 17 POWDER, FOR SOLUTION ORAL at 09:11

## 2018-11-16 RX ADMIN — ONDANSETRON 8 MG: 8 TABLET, ORALLY DISINTEGRATING ORAL at 10:11

## 2018-11-16 RX ADMIN — HYDROCODONE BITARTRATE AND ACETAMINOPHEN 1 TABLET: 10; 325 TABLET ORAL at 03:11

## 2018-11-16 RX ADMIN — FAMOTIDINE 20 MG: 20 TABLET, FILM COATED ORAL at 09:11

## 2018-11-16 RX ADMIN — CELECOXIB 200 MG: 200 CAPSULE ORAL at 09:11

## 2018-11-16 RX ADMIN — HYDROCODONE BITARTRATE AND ACETAMINOPHEN 1 TABLET: 10; 325 TABLET ORAL at 10:11

## 2018-11-16 RX ADMIN — HYDROCODONE BITARTRATE AND ACETAMINOPHEN 1 TABLET: 10; 325 TABLET ORAL at 02:11

## 2018-11-16 RX ADMIN — CEFAZOLIN SODIUM 2 G: 2 SOLUTION INTRAVENOUS at 07:11

## 2018-11-16 RX ADMIN — BUPROPION HYDROCHLORIDE 150 MG: 150 TABLET, EXTENDED RELEASE ORAL at 09:11

## 2018-11-16 RX ADMIN — CEFAZOLIN SODIUM 2 G: 2 SOLUTION INTRAVENOUS at 12:11

## 2018-11-16 NOTE — PLAN OF CARE
Problem: Patient Care Overview  Goal: Plan of Care Review  Pt currently on  RA . Pt in no distress at this time. Sats  96 %. Will continue to monitor.

## 2018-11-16 NOTE — PT/OT/SLP PROGRESS
Physical Therapy Treatment 1 and 2    Patient Name:  Emerita Escudero   MRN:  [id number]    Recommendations:     Discharge Recommendations:  home health PT, home health OT, home with home health   Discharge Equipment Recommendations: 3-in-1 commode   Barriers to discharge: None    Assessment:     Emerita Escudero is a 78 y.o. female admitted with a medical diagnosis of Failed total left knee replacement.  She presents with the following impairments/functional limitations:  weakness, impaired functional mobilty, pain, gait instability, decreased ROM, decreased lower extremity function, orthopedic precautions.    Patient with significant pain during first session which continued to limit progress with therapy. Although patient ambulated with an advanced gait and better tolerated cueing throughout gait training, patient was not able to ambulate > 40 ft with RW, CGA + chair follow. Patient able to execute therapeutic exercises properly with minimal verbal cues.  During second session, patient's pain had improved and was better able to tolerate the session. Patient ambulated ~75' with RW, CGA and tolerated 4 inch step negotiation 2x with CGA and RW. Due to patient's bed height, patient practiced and was able to demonstrate good safety getting in and out of bed at a height of 34 inches tall. Although patient is requiring CGA for sit to stand with RW, patient demonstrates good progress since yesterday. Patient would benefit from HH upon discharge in order to optimize functional mobility outcomes and maximize patient safety.     Rehab Prognosis:  Good; patient would benefit from acute skilled PT services to address these deficits and reach maximum level of function.      Recent Surgery: Procedure(s) (LRB):  ARTHROPLASTY, KNEE (Left) 1 Day Post-Op    Plan:     During this hospitalization, patient to be seen BID to address the above listed problems via gait training, therapeutic activities, therapeutic  "exercises  · Plan of Care Expires:  12/15/18   Plan of Care Reviewed with: patient, spouse    Subjective     Communicated with RN prior to session.  Patient found sitting in chair upon PT entry to room, agreeable to treatment.      Chief Complaint: Pain  Patient comments/goals: "There's no way those pills are working. They just don't work" -- regarding pain medication     Pain/Comfort:  · Pain Rating 1: 7/10  · Location - Side 1: Left  · Location - Orientation 1: generalized  · Location 1: knee  · Pain Addressed 1: Nurse notified, Pre-medicate for activity  · Pain Rating Post-Intervention 1: 10/10    Patients cultural, spiritual, Mu-ism conflicts given the current situation: none stated     Objective:     Patient found with: cryotherapy     General Precautions: Standard, fall   Orthopedic Precautions:LLE weight bearing as tolerated   Braces: N/A     Functional Mobility:  · Transfers:     · Sit <> Stand:  contact guard assistance with rolling walker  · During sessions 1 and 2, patient required CGA for safety  · First session:   · Moderate verbal cues for hand and feet positioning in regarding to RW  · Required 3 attempts to stand  · Second session:  ·  Minimal verbal cues for hand and feet placement   · Able to t/f to standing on first attempt  · Gait:  · First session  · Ambulated 40' with RW, CGA for safety + chair follow  · Moderate cueing for heel contact, toe- off, and to improve L knee flexion during swing phase  · Minimal cueing for symmetrical step length  · Noted to have decreased step length on RLE 2/2 decreased stance time on LLE  · Second session   · Ambulated 75' with RW, CGA for safety + chair follow  · Minimal cueing for heel contact, toe-off and L knee flexion during L swing  · Step- through gait noted without cueing  · RLE toe in during stance; cued to keep hip neutral and prevent IR  · Balance:   · Both sessions standing: CGA for safety with RW  · Stairs: PT demonstrated prior to patient " "execution of step negotiation  ·  Pt ascended/descended 4" curb step with Rolling Walker with no handrails with Contact Guard Assistance  · During second session, tolerated 2x step negotiation  · Upon first trial, poor foot clearance during ascent and descent  · Second trial, complete clearenece       AM-PAC 6 CLICK MOBILITY  Turning over in bed (including adjusting bedclothes, sheets and blankets)?: 4  Sitting down on and standing up from a chair with arms (e.g., wheelchair, bedside commode, etc.): 3  Moving from lying on back to sitting on the side of the bed?: 4  Moving to and from a bed to a chair (including a wheelchair)?: 3  Need to walk in hospital room?: 3  Climbing 3-5 steps with a railing?: 3  Basic Mobility Total Score: 20       Therapeutic Activities and Exercises:  · First session  · Pt performed sitting therapeutic exercises including hip flexion, long arc quads, ankle pumps x 10 reps with verbal and visual cues.  · Second  · Elevated bed to 34 inches   · PT demonstrated a side step onto stool to get into bed (on L side)  · Patient required CGA x 2 for safety to get into and out of bed using step stool     Patient left up in chair with call button in reach, RN notified and  present..    GOALS:   Multidisciplinary Problems     Physical Therapy Goals        Problem: Physical Therapy Goal    Goal Priority Disciplines Outcome Goal Variances Interventions   Physical Therapy Goal     PT, PT/OT Ongoing (interventions implemented as appropriate)     Description:  Goals to be met by: 18    Patient will increase functional independence with mobility by performin. Supine to sit with supervision, no verbal cues for hand placement  2. Sit to supine with supervision, no cues for hand placement   3. Sit<>stand transfer with supervision using rolling walker with no verbal cues for hands/feet placement.   4. Gait > 150 feet with SBA using rolling walker with adequate L knee flexion during swing " phase.   5. Ascend/descend 4 inch step with no handrails with CGA with or without AD -- GOAL Achieved 11/16/18                       Time Tracking:     PT Received On: 11/16/18  PT Start Time: 1115     PT Stop Time: 1153  PT Total Time (min): 38 min     PT time in: 0959  PT time out: 1020  Total time: 21 minutes    Billable Minutes: Gait Training 13 and Therapeutic Activity 25   Billable Minutes: Therapeutic Activity 21    Treatment Type: Treatment  PT/PTA: PT     PTA Visit Number: 0     Grazyna Head, PT  11/16/2018

## 2018-11-16 NOTE — PROGRESS NOTES
Consult Note  Nephrology    Consult Requested By: Landon Xiao MD  Reason for Consult: HTN/thyroid/allergies    SUBJECTIVE:     Interval History: No overnight events.  Pain better controlled.  Tolerating po.  Nausea resolved.  Acceptable BPs.    Past Medical History:   Diagnosis Date    Asthma     Hypertension     Lipids abnormal     Seasonal allergies     Thyroid disease      Past Surgical History:   Procedure Laterality Date    CHOLECYSTECTOMY      COSMETIC SURGERY      face lift    DILATION AND CURETTAGE OF UTERUS      EYE SURGERY      cataract    partial knee replacement Left     TONSILLECTOMY       History reviewed. No pertinent family history.  Social History     Tobacco Use    Smoking status: Never Smoker    Smokeless tobacco: Never Used   Substance Use Topics    Alcohol use: Yes     Alcohol/week: 0.6 - 1.2 oz     Types: 1 - 2 Glasses of wine per week     Comment: nightly    Drug use: No       Review of patient's allergies indicates:   Allergen Reactions    Scopolamine Other (See Comments)     Became weak, speech affected    Latex, natural rubber Itching        Review of Systems:  Constitutional: No fever or chills  Respiratory: No cough or shortness of breath  Cardiovascular: No chest pain or palpitations  Gastrointestinal: No nausea or vomiting  Neurological: No confusion or weakness    OBJECTIVE:     Vital Signs (Most Recent)  Temp: 97.7 °F (36.5 °C) (11/16/18 0717)  Pulse: (!) 59 (11/16/18 0717)  Resp: 18 (11/16/18 0717)  BP: 138/72 (11/16/18 0717)  SpO2: 95 % (11/16/18 0717)    Vital Signs Range (Last 24H):  Temp:  [97.2 °F (36.2 °C)-98.1 °F (36.7 °C)]   Pulse:  [59-71]   Resp:  [16-18]   BP: (138-187)/(72-83)   SpO2:  [95 %-98 %]       Intake/Output Summary (Last 24 hours) at 11/16/2018 0920  Last data filed at 11/16/2018 0700  Gross per 24 hour   Intake 1827.5 ml   Output 2600 ml   Net -772.5 ml       Physical Exam:  General appearance: Well developed, well nourished  Eyes:   Conjunctivae/corneas clear. PERRL.  Lungs: Normal respiratory effort,   clear to auscultation bilaterally   Heart: Regular rate and rhythm, S1, S2 normal, no murmur, rub or rolanda.  Abdomen: Soft, non-tender non-distended; bowel sounds normal; no masses,  no organomegaly, obese  Extremities: No cyanosis or clubbing. No edema.    Skin: Skin color, texture, turgor normal. No rashes or lesions  Neurologic: Normal strength and tone. No focal numbness or weakness   Left Knee CDI      Laboratory:  Recent Labs   Lab 11/16/18 0458   WBC 8.20   RBC 3.26*   HGB 11.2*   HCT 32.8*   *   *   MCH 34.4*   MCHC 34.1     BMP:   Recent Labs   Lab 11/16/18 0458   *   *   K 3.8      CO2 24   BUN 8   CREATININE 0.7   CALCIUM 8.8     Lab Results   Component Value Date    CALCIUM 8.8 11/16/2018     BNP  No results for input(s): BNP, BNPTRIAGEBLO in the last 168 hours.No results found for: URICACIDNo results found for: IRON, TIBC, FERRITIN, SATURATEDIRO  Lab Results   Component Value Date    CALCIUM 8.8 11/16/2018       Diagnostic Results:  X-Ray Knee 1 or 2 View Left   Final Result      As above         Electronically signed by: Andi Peace   Date:    11/15/2018   Time:    09:17          ASSESSMENT/PLAN:     1. S/P Left Knee Repair (T84.093A):  Per ortho/PT/OT.  2. HTN (I10):  Restarted home meds with hold parameters.  BB/ARB/HCTZ.  Clonidine PRN.    3. Hypothyroid (E03.9):  Home dose of synthroid (pork).    4. Seasonal allergies (J30.2):  Singulair and inhalers.    5. DVT Prophy:  ASA BID.     Dispo: stable for discharge from medicine standpoint.  She will need close f/u with Ortho and PCP at discharge.    Jg Horta MD  Nephrology

## 2018-11-16 NOTE — PLAN OF CARE
"Problem: Occupational Therapy Goal  Goal: Occupational Therapy Goal  Goals to be met by: 12/16/2018     Patient will increase functional independence with ADLs by performing:    LE Dressing with Stand-by Assistance.  Grooming while standing at sink with Supervision.  Toileting from toilet with Stand-by Assistance for hygiene and clothing management.   Step transfer with Stand-by Assistance  Toilet transfer to toilet with Stand-by Assistance.  Pt will completed transfer out of 34" simulated bed height using step stool with SBA.   Functional mobility at house-hold level using RW with SBA in prep for ADLs.     Outcome: Ongoing (interventions implemented as appropriate)  OT enma completed and goals set.     Lyndsey Ragsdale, KATIE/L  11/16/2018        "

## 2018-11-16 NOTE — DISCHARGE SUMMARY
[facility]  Discharge Summary      Admit Date: 11/15/2018    Discharge Date and Time:  11/16/2018 3:25 PM    Attending Physician: Landon Xiao MD     Reason for Admission:  Failed left ox ford    Procedures Performed: Procedure(s) (LRB):  ARTHROPLASTY, KNEE (Left)    Hospital Course (synopsis of major diagnoses, care, treatment, and services provided during the course of the hospital stay): The above patient underwent the above procedure.  Post operative the patient received pain management and pt / ot. The patient progressed well and will be discharged--see orders.     Consults: nephrology    Significant Diagnostic Studies: Labs:   BMP:   Recent Labs   Lab 11/16/18  0458   *   *   K 3.8      CO2 24   BUN 8   CREATININE 0.7   CALCIUM 8.8       Final Diagnoses:    Principal Problem: Failed total left knee replacement   Secondary Diagnoses:   Active Hospital Problems   No active problems to display.      Resolved Hospital Problems    Diagnosis Date Resolved POA    *Failed total left knee replacement [T84.093A] 11/16/2018 Not Applicable    Failed total left knee replacement [T84.093A] 11/15/2018 Not Applicable       Discharged Condition: good    [place] [facility] [place]    [place]/Patient Instructions:     Medications:  Reconciled Home Medications:      Medication List      START taking these medications    aspirin 81 MG Chew  Take 1 tablet (81 mg total) by mouth 2 (two) times daily.     HYDROcodone-acetaminophen  mg per tablet  Commonly known as:  NORCO  Take 1 tablet by mouth every 6 (six) hours as needed.        CONTINUE taking these medications    ARMOUR THYROID ORAL  Take 120 mg by mouth once daily.     buPROPion 150 MG TBSR 12 hr tablet  Commonly known as:  WELLBUTRIN SR  Take 150 mg by mouth once daily.     eszopiclone 1 MG Tab  Commonly known as:  LUNESTA  Take 3 mg by mouth nightly as needed.     fluticasone-salmeterol 250-50 mcg/dose 250-50  mcg/dose diskus inhaler  Commonly known as:  ADVAIR  Inhale 1 puff into the lungs 2 (two) times daily. Controller     loperamide 2 mg Tab  Commonly known as:  IMODIUM A-D  Take 2 mg by mouth 4 (four) times daily as needed.     metoprolol succinate 25 MG 24 hr tablet  Commonly known as:  TOPROL-XL  Take 25 mg by mouth every evening.     montelukast 10 mg tablet  Commonly known as:  SINGULAIR  Take 10 mg by mouth every evening.     rosuvastatin 5 MG tablet  Commonly known as:  CRESTOR  Take 10 mg by mouth.     valsartan-hydrochlorothiazide 160-25 mg per tablet  Commonly known as:  DIOVAN-HCT  Take 1 tablet by mouth once daily.     vitamin D 1000 units Tab  Commonly known as:  VITAMIN D3  Take 1,000 Units by mouth once daily.          No discharge procedures on file.  Follow-up Information     [facility] [place].    Specialties:  Home Health Services, Physical Therapy, Occupational Therapy  Why:  Home Health   Contact information:  [facility] [address]  [phone]             Please follow up.    Why:  AS SCHEDULED , Call [phone] to reach Dr. Xiao

## 2018-11-16 NOTE — PT/OT/SLP EVAL
"Occupational Therapy   Evaluation and Treatment     Name: Emerita Escudero  MRN: [id number]  Admitting Diagnosis:  Failed total left knee replacement 1 Day Post-Op    Recommendations:     Discharge Recommendations: home with home health, home health PT, home health OT  Discharge Equipment Recommendations:  3-in-1 commode  Barriers to discharge:  None    History:     Occupational Profile:  Living Environment: Pt lives with  in [facility] with elevator access. There is one step to get into her elevator. She has a "step up shower" with grab bars. Regular height toilet. She does have very high bed (34") with step stool.   Previous level of function: mod (I) using SPC during recent functional mobility. (I) with ADLs and IADLs.   Roles and Routines: retired. Enjoys painting and taking walks with her .   Equipment Used at Home:  walker, rolling, grab bar, cane, straight  Assistance upon Discharge:  is also retired and available for 24/7 (A)/SPV    Past Medical History:   Diagnosis Date    Asthma     Hypertension     Lipids abnormal     Seasonal allergies     Thyroid disease        Past Surgical History:   Procedure Laterality Date    CHOLECYSTECTOMY      COSMETIC SURGERY      face lift    DILATION AND CURETTAGE OF UTERUS      EYE SURGERY      cataract    partial knee replacement Left     TONSILLECTOMY         Subjective     Chief Complaint: "My knee started to hurt when I put weight through it."  Patient/Family Comments/goals: return home at [facility]     Pain/Comfort:  · Pain Rating 1: 6/10(with weightbearing )  · Location - Side 1: Left  · Location - Orientation 1: generalized  · Location 1: knee  · Pain Addressed 1: Reposition, Nurse notified, Pre-medicate for activity  · Pain Rating Post-Intervention 1: 0/10(at rest )    Patients cultural, spiritual, [employer]ism conflicts given the current situation: None stated    Objective:     Communicated with: RN prior to session.  Patient found with: all " "lines intact, call button in reach and RN notified and CPM, cryotherapy, peripheral IV, FCD upon OT entry to room.    General Precautions: Standard, fall   Orthopedic Precautions:LLE weight bearing as tolerated   Braces: N/A     Occupational Performance:    Bed Mobility:    · Patient completed Scooting/Bridging with supervision  · Patient completed Supine to Sit with supervision  · Patient completed Sit to Supine with supervision   · Practiced exiting the hospital bed from 34" height to simulate her home s/u with use of stool. Provided cues and demo on proper technique. Pt required CGA throughout transfer.     Functional Mobility/Transfers:  · Patient completed Sit <> Stand Transfer with contact guard assistance  with  rolling walker   · Patient completed Bed <> Chair Transfer using Step Transfer technique with contact guard assistance with rolling walker  · Functional Mobility: short, house-hold distance using RW with CGA; cues for RW technique- pt with tendency to set to the side when going for descent into chair.     Activities of Daily Living:  · Upper Body Dressing: minimum assistance for closure of her bra (baseline- her  helps her with this)  · Lower Body Dressing: minimum assistance incidental (A) to lace carrie feet, don and tie tennis shoes and CGA for standing balance to pull pants and underwear over her hips    Cognitive/Visual Perceptual:  Cognitive/Psychosocial Skills:     -       Oriented to: Person, Place, Time and Situation   -       Follows Commands/attention:Follows multistep  commands  -       Communication: clear/fluent  -       Safety awareness/insight to disability: intact   -       Mood/Affect/Coping skills/emotional control: Appropriate to situation  Visual/Perceptual:      -Intact    Physical Exam:  Balance:    -       static sitting with SPV, dynamic sitting with SBA, static and dynamic standing with CGA  Postural examination/scapula alignment:    -       Rounded shoulders  Skin " "integrity: Visible skin intact and incisional site covered with bandage- margins clean and dry  Edema:  Mild (L) LE  Sensation:    -       Intact  Upper Extremity Range of Motion:     -       Right Upper Extremity: WFL  -       Left Upper Extremity: WFL  Upper Extremity Strength:    -       Right Upper Extremity: WFL  -       Left Upper Extremity: WFL   Strength:    -       Right Upper Extremity: WFL  -       Left Upper Extremity: WFL  Fine Motor Coordination:    -       Intact  Gross motor coordination:   WFL    AMPAC 6 Click ADL:  AMPAC Total Score: 20    Treatment & Education:  Pt educated on OT role and POC, LB dressing techniques, transfer s/u, appropriate RW technique, transfer s/u from high bed surface using a step stool and safety with RW throughout ADLs.    Encouraged patient to practice her bed transfer and shower transfer with HH OT/PT for optimal safety and best technique in actual home set up. Pt receptive and agreeable.    Education:    Patient left up in chair with all lines intact, call button in reach and RN notified    Assessment:     Emerita Escudero is a 78 y.o. female with a medical diagnosis of Failed total left knee replacement.  She presents with the following performance deficits affecting function: weakness, impaired functional mobilty, impaired self care skills, gait instability, impaired balance, pain, decreased ROM, edema, orthopedic precautions.     Pt tolerated her OT evaluation fairly well. No c/o pain at rest, however increased in (L) knee with weightbearing. Practiced bed transfer from elevated (34") surface to simulate home s/u which patient required CGA with. Encourage practicing with HH OT/PT as well to maximize safe technique in actual home environment. Pt required CGA for all dynamic standing tasks, including functional mobility with RW. Pt did require min (A) with UB and LB dressing secondary to decreased (L) knee AROM and pain. Some limitations with dressing were " "baseline, however pt has her  available to (A) . Recommending HH OT/PT upon D/C. DME needs include: 3-in-1.      Rehab Prognosis: Good; patient would benefit from acute skilled OT services to address these deficits and reach maximum level of function.         Clinical Decision Makin.  OT Low:  "Pt evaluation falls under low complexity for evaluation coding due to performance deficits noted in 1-3 areas as stated above and 0 co-morbities affecting current functional status. Data obtained from problem focused assessments. No modifications or assistance was required for completion of evaluation. Only brief occupational profile and history review completed."     Plan:     Patient to be seen 6 x/week to address the above listed problems via self-care/home management, therapeutic activities, therapeutic exercises  · Plan of Care Expires: 18  · Plan of Care Reviewed with: patient    This Plan of care has been discussed with the patient who was involved in its development and understands and is in agreement with the identified goals and treatment plan    GOALS:   Multidisciplinary Problems     Occupational Therapy Goals        Problem: Occupational Therapy Goal    Goal Priority Disciplines Outcome Interventions   Occupational Therapy Goal     OT, PT/OT Ongoing (interventions implemented as appropriate)    Description:  Goals to be met by: 2018     Patient will increase functional independence with ADLs by performing:    LE Dressing with Stand-by Assistance.  Grooming while standing at sink with Supervision.  Toileting from toilet with Stand-by Assistance for hygiene and clothing management.   Step transfer with Stand-by Assistance  Toilet transfer to toilet with Stand-by Assistance.  Pt will completed transfer out of 34" simulated bed height using step stool with SBA.   Functional mobility at house-hold level using RW with SBA in prep for ADLs.                       Time Tracking:     OT Date " of Treatment: 11/16/18  OT Start Time: 0804  OT Stop Time: 0848  OT Total Time (min): 44 min    Billable Minutes:Evaluation 19  Self Care/Home Management 25    KATIE Santillan/VANESSA  11/16/2018

## 2018-11-16 NOTE — PLAN OF CARE
Problem: Patient Care Overview  Goal: Plan of Care Review  Outcome: Outcome(s) achieved Date Met: 11/16/18  VU of DC instructions. Medication delivered per outpatient pharmacy. IV removed w/ cath tip intact, WNL. Verified HH & DME set up via CMGT & walker has arrived to pt room.Combipack and CPM packed.  VSS on RA and afebrile. Dressing to L knee reinforced. Pain mildly controlled with PRN PO meds. Voided spontaneously. Free from falls, injury, or skin breakdown this hospital admission.

## 2018-11-16 NOTE — PLAN OF CARE
Problem: Physical Therapy Goal  Goal: Physical Therapy Goal  Goals to be met by: 18    Patient will increase functional independence with mobility by performin. Supine to sit with supervision, no verbal cues for hand placement  2. Sit to supine with supervision, no cues for hand placement   3. Sit<>stand transfer with supervision using rolling walker with no verbal cues for hands/feet placement.   4. Gait > 150 feet with SBA using rolling walker with adequate L knee flexion during swing phase.   5. Ascend/descend 4 inch step with no handrails with CGA with or without AD -- GOAL Achieved 18     Outcome: Ongoing (interventions implemented as appropriate)  Patient with significant pain during first session which continued to limit progress with therapy. Although patient ambulated with an advanced gait and better tolerated cueing throughout gait training, patient was not able to ambulate > 40 ft with RW, CGA + chair follow. Patient able to execute therapeutic exercises properly with minimal verbal cues.  During second session, patient's pain had improved and was better able to tolerate the session. Patient ambulated ~75' with RW, CGA and tolerated 4 inch step negotiation 2x with CGA and RW. Due to patient's bed height, patient practiced and was able to demonstrate good safety getting in and out of bed at a height of 34 inches tall. Although patient is requiring CGA for sit to stand with RW, patient demonstrates good progress since yesterday. Patient would benefit from HH upon discharge in order to optimize functional mobility outcomes and maximize patient safety.

## 2018-11-16 NOTE — NURSING
No significant events overnight. Remains free from fall, injury, and skin breakdown. Voiding via hall to gravity; hall removed POD 1. VSS on RA throughout the night. Pain well controlled with PO meds. Incision/dressing CDI; dressing changed POD 1 per order. TEDs/FCDs in place; continuous cooling maintained. Plan of care reviewed with patient and all questions answered. Bed low, locked w/ bed alarm on. Call light within reach. Purposeful rounding performed. Resting comfortably in bed, no other complaints at this time.

## 2018-11-16 NOTE — PROGRESS NOTES
"IM  Progress Note    Admit Date: 11/15/2018   LOS: 1 day     SUBJECTIVE:     Follow-up For:  Failed total left knee replacement    Interval History:     POD #1 left knee repair. Up in chair for eval,  at bedside. Denies CP,SOB,N or V. Urinating with ease, no calf tenderness. Ready to go home.    Review of Systems:  Constitutional: No fever or chills  Respiratory: No cough or shortness of breath  Cardiovascular: No chest pain or palpitations  Gastrointestinal: No nausea or vomiting  Neurological: No confusion or weakness    OBJECTIVE:     Vital Signs Range (Last 24H):  /72 (Patient Position: Lying)   Pulse (!) 59   Temp 97.7 °F (36.5 °C) (Oral)   Resp 18   Ht 5' 4" (1.626 m)   Wt 87.5 kg (193 lb)   SpO2 95%   Breastfeeding? No   BMI 33.13 kg/m²     Temp:  [97.2 °F (36.2 °C)-98.1 °F (36.7 °C)]   Pulse:  [59-71]   Resp:  [16-18]   BP: (138-169)/(72-81)   SpO2:  [95 %-98 %]     I & O (Last 24H):    Intake/Output Summary (Last 24 hours) at 11/16/2018 1236  Last data filed at 11/16/2018 0700  Gross per 24 hour   Intake 1827.5 ml   Output 2600 ml   Net -772.5 ml       Physical Exam:  General appearance: Well developed, well nourished  Eyes:  Conjunctivae/corneas clear. PERRL.  Lungs: Normal respiratory effort,   clear to auscultation bilaterally   Heart: Regular rate and rhythm, S1, S2 normal, no murmur, rub or rolanda.  Abdomen: Soft, non-tender non-distended; bowel sounds normal; no masses,  no organomegaly  Extremities: No cyanosis or clubbing. No edema.  +2 pulses BLE  Skin: Skin color, texture, turgor normal. No rashes or lesions, sanguineous drainage noted on LIZA, dressing beneath CDI just above inferior edge.  Neurologic: Normal strength and tone. No focal numbness or weakness     Laboratory Data:  Recent Labs   Lab 11/16/18  0458   WBC 8.20   RBC 3.26*   HGB 11.2*   HCT 32.8*   *   *   MCH 34.4*   MCHC 34.1       BMP:   Recent Labs   Lab 11/16/18  0458   *   *   K 3.8 "      CO2 24   BUN 8   CREATININE 0.7   CALCIUM 8.8     Lab Results   Component Value Date    CALCIUM 8.8 11/16/2018               Medications:  Medication list was reviewed and changes noted under Assessment/Plan.    Diagnostic Results:    Reviewed    ASSESSMENT/PLAN:     1. S/P Left Knee Repair (T84.093A):  POD #1, Per ortho/PT/OT.  2. HTN (I10):  Restart home meds with hold parameters.  BB/ARB/HCTZ.  Clonidine PRN.    3. Hypothyroid (E03.9): continue home med  4. ABLA (D62): noted on AM labs. Asymptomatic.   5. Seasonal allergies (J30.2):  Singulair and inhalers.    6. DVT Prophy:  ASA BID.      Medically stable for discharge today.    Patient seen and examined by NP. Labs reviewed. Agree with A/P as written.

## 2018-11-17 NOTE — PT/OT/SLP DISCHARGE
"Occupational Therapy Discharge Summary    Emerita Escudero  MRN: [id number]   Principal Problem: Failed total left knee replacement      Patient Discharged from acute Occupational Therapy on 11/16/18.  Please refer to prior OT note dated 11/16/18 for functional status.    Assessment:      Patient appropriate for care in another setting.    Objective:     GOALS:   Multidisciplinary Problems     Occupational Therapy Goals        Problem: Occupational Therapy Goal    Goal Priority Disciplines Outcome Interventions   Occupational Therapy Goal     OT, PT/OT Ongoing (interventions implemented as appropriate)    Description:  Goals to be met by: 12/16/2018     Patient will increase functional independence with ADLs by performing:    LE Dressing with Stand-by Assistance.  Grooming while standing at sink with Supervision.  Toileting from toilet with Stand-by Assistance for hygiene and clothing management.   Step transfer with Stand-by Assistance  Toilet transfer to toilet with Stand-by Assistance.  Pt will completed transfer out of 34" simulated bed height using step stool with SBA.   Functional mobility at house-hold level using RW with SBA in prep for ADLs.                       Reasons for Discontinuation of Therapy Services  Transfer to alternate level of care.      Plan:     Patient Discharged to: Home with Home Health Service    CARON Horowitz  11/17/2018  "

## 2018-11-17 NOTE — PROGRESS NOTES
Physical Therapy Discharge Summary    Name: Emerita Escudero  MRN: [id number]   Principal Problem: Failed total left knee replacement     Patient Discharged from acute Physical Therapy on 18.  Please refer to prior PT noted date on 18 for functional status.     Assessment:     Patient appropriate for care in another setting.    Objective:     GOALS:   Multidisciplinary Problems     Physical Therapy Goals        Problem: Physical Therapy Goal    Goal Priority Disciplines Outcome Goal Variances Interventions   Physical Therapy Goal     PT, PT/OT Ongoing (interventions implemented as appropriate)     Description:  Goals to be met by: 18    Patient will increase functional independence with mobility by performin. Supine to sit with supervision, no verbal cues for hand placement  2. Sit to supine with supervision, no cues for hand placement   3. Sit<>stand transfer with supervision using rolling walker with no verbal cues for hands/feet placement.   4. Gait > 150 feet with SBA using rolling walker with adequate L knee flexion during swing phase.   5. Ascend/descend 4 inch step with no handrails with CGA with or without AD -- GOAL Achieved 18                       Reasons for Discontinuation of Therapy Services  Transfer to alternate level of care.      Plan:     Patient Discharged to: Home with Home Health Service.    Diego Thomas, PT  2018

## 2021-02-15 ENCOUNTER — HOSPITAL ENCOUNTER (OUTPATIENT)
Dept: PREADMISSION TESTING | Facility: OTHER | Age: 81
Discharge: HOME | End: 2021-02-15
Attending: ORTHOPAEDIC SURGERY
Payer: MEDICARE

## 2021-02-15 ENCOUNTER — ANESTHESIA EVENT (OUTPATIENT)
Dept: SURGERY | Facility: OTHER | Age: 81
End: 2021-02-15
Payer: MEDICARE

## 2021-02-15 DIAGNOSIS — Z01.818 PRE-OP TESTING: ICD-10-CM

## 2021-02-15 LAB
ANION GAP SERPL CALC-SCNC: 13 MMOL/L (ref 8–16)
BACTERIA #/AREA URNS HPF: ABNORMAL /HPF
BASOPHILS # BLD AUTO: 0.07 K/UL (ref 0–0.2)
BASOPHILS NFR BLD: 0.8 % (ref 0–1.9)
BILIRUB UR QL STRIP: NEGATIVE
BUN SERPL-MCNC: 9 MG/DL (ref 8–23)
CALCIUM SERPL-MCNC: 10 MG/DL (ref 8.7–10.5)
CHLORIDE SERPL-SCNC: 95 MMOL/L (ref 95–110)
CLARITY UR: CLEAR
CO2 SERPL-SCNC: 25 MMOL/L (ref 23–29)
COLOR UR: YELLOW
CREAT SERPL-MCNC: 0.8 MG/DL (ref 0.5–1.4)
DIFFERENTIAL METHOD: ABNORMAL
EOSINOPHIL # BLD AUTO: 0.3 K/UL (ref 0–0.5)
EOSINOPHIL NFR BLD: 3.7 % (ref 0–8)
ERYTHROCYTE [DISTWIDTH] IN BLOOD BY AUTOMATED COUNT: 13.7 % (ref 11.5–14.5)
EST. GFR  (AFRICAN AMERICAN): >60 ML/MIN/1.73 M^2
EST. GFR  (NON AFRICAN AMERICAN): >60 ML/MIN/1.73 M^2
GLUCOSE SERPL-MCNC: 102 MG/DL (ref 70–110)
GLUCOSE UR QL STRIP: NEGATIVE
HCT VFR BLD AUTO: 40.8 % (ref 37–48.5)
HGB BLD-MCNC: 13.5 G/DL (ref 12–16)
HGB UR QL STRIP: NEGATIVE
HYALINE CASTS #/AREA URNS LPF: 40 /LPF
IMM GRANULOCYTES # BLD AUTO: 0.01 K/UL (ref 0–0.04)
IMM GRANULOCYTES NFR BLD AUTO: 0.1 % (ref 0–0.5)
KETONES UR QL STRIP: NEGATIVE
LEUKOCYTE ESTERASE UR QL STRIP: ABNORMAL
LYMPHOCYTES # BLD AUTO: 1.6 K/UL (ref 1–4.8)
LYMPHOCYTES NFR BLD: 19.1 % (ref 18–48)
MCH RBC QN AUTO: 31.7 PG (ref 27–31)
MCHC RBC AUTO-ENTMCNC: 33.1 G/DL (ref 32–36)
MCV RBC AUTO: 96 FL (ref 82–98)
MICROSCOPIC COMMENT: ABNORMAL
MONOCYTES # BLD AUTO: 0.8 K/UL (ref 0.3–1)
MONOCYTES NFR BLD: 9.2 % (ref 4–15)
NEUTROPHILS # BLD AUTO: 5.6 K/UL (ref 1.8–7.7)
NEUTROPHILS NFR BLD: 67.1 % (ref 38–73)
NITRITE UR QL STRIP: NEGATIVE
NRBC BLD-RTO: 0 /100 WBC
PH UR STRIP: 7 [PH] (ref 5–8)
PLATELET # BLD AUTO: 227 K/UL (ref 150–350)
PMV BLD AUTO: 10.3 FL (ref 9.2–12.9)
POTASSIUM SERPL-SCNC: 3.9 MMOL/L (ref 3.5–5.1)
PROT UR QL STRIP: NEGATIVE
RBC # BLD AUTO: 4.26 M/UL (ref 4–5.4)
RBC #/AREA URNS HPF: 0 /HPF (ref 0–4)
SODIUM SERPL-SCNC: 133 MMOL/L (ref 136–145)
SP GR UR STRIP: 1.01 (ref 1–1.03)
SQUAMOUS #/AREA URNS HPF: 35 /HPF
URN SPEC COLLECT METH UR: ABNORMAL
UROBILINOGEN UR STRIP-ACNC: NEGATIVE EU/DL
WBC # BLD AUTO: 8.38 K/UL (ref 3.9–12.7)
WBC #/AREA URNS HPF: 5 /HPF (ref 0–5)

## 2021-02-15 PROCEDURE — 36415 COLL VENOUS BLD VENIPUNCTURE: CPT

## 2021-02-15 PROCEDURE — 80048 BASIC METABOLIC PNL TOTAL CA: CPT

## 2021-02-15 PROCEDURE — 81000 URINALYSIS NONAUTO W/SCOPE: CPT

## 2021-02-15 PROCEDURE — 85025 COMPLETE CBC W/AUTO DIFF WBC: CPT

## 2021-02-15 RX ORDER — LIDOCAINE HYDROCHLORIDE 10 MG/ML
0.5 INJECTION, SOLUTION EPIDURAL; INFILTRATION; INTRACAUDAL; PERINEURAL ONCE
Status: CANCELLED | OUTPATIENT
Start: 2021-02-15 | End: 2021-02-15

## 2021-02-15 RX ORDER — CLONAZEPAM 0.5 MG/1
0.5 TABLET ORAL 2 TIMES DAILY
COMMUNITY

## 2021-02-15 RX ORDER — SODIUM CHLORIDE, SODIUM LACTATE, POTASSIUM CHLORIDE, CALCIUM CHLORIDE 600; 310; 30; 20 MG/100ML; MG/100ML; MG/100ML; MG/100ML
INJECTION, SOLUTION INTRAVENOUS CONTINUOUS
Status: CANCELLED | OUTPATIENT
Start: 2021-02-15

## 2021-02-15 RX ORDER — OXYBUTYNIN CHLORIDE 5 MG/1
5 TABLET ORAL ONCE
COMMUNITY

## 2021-02-15 RX ORDER — METOPROLOL SUCCINATE 25 MG/1
25 TABLET, EXTENDED RELEASE ORAL 2 TIMES DAILY
COMMUNITY

## 2021-02-15 RX ORDER — AMLODIPINE BESYLATE 5 MG/1
5 TABLET ORAL DAILY
COMMUNITY

## 2021-02-15 RX ORDER — ACETAMINOPHEN 500 MG
1000 TABLET ORAL
Status: CANCELLED | OUTPATIENT
Start: 2021-02-15 | End: 2021-02-15

## 2021-02-18 ENCOUNTER — HOSPITAL ENCOUNTER (OUTPATIENT)
Facility: OTHER | Age: 81
Discharge: HOME HEALTH | End: 2021-02-19
Attending: ORTHOPAEDIC SURGERY | Admitting: ORTHOPAEDIC SURGERY
Payer: MEDICARE

## 2021-02-18 ENCOUNTER — ANESTHESIA (OUTPATIENT)
Dept: SURGERY | Facility: OTHER | Age: 81
End: 2021-02-18
Payer: MEDICARE

## 2021-02-18 DIAGNOSIS — Z01.818 PRE-OP TESTING: ICD-10-CM

## 2021-02-18 DIAGNOSIS — M17.9 OA (OSTEOARTHRITIS) OF KNEE: ICD-10-CM

## 2021-02-18 PROCEDURE — 97162 PT EVAL MOD COMPLEX 30 MIN: CPT

## 2021-02-18 PROCEDURE — 36000710: Performed by: ORTHOPAEDIC SURGERY

## 2021-02-18 PROCEDURE — 63600175 PHARM REV CODE 636 W HCPCS: Performed by: ANESTHESIOLOGY

## 2021-02-18 PROCEDURE — 25000003 PHARM REV CODE 250: Performed by: NURSE ANESTHETIST, CERTIFIED REGISTERED

## 2021-02-18 PROCEDURE — 27201423 OPTIME MED/SURG SUP & DEVICES STERILE SUPPLY: Performed by: ORTHOPAEDIC SURGERY

## 2021-02-18 PROCEDURE — 37000009 HC ANESTHESIA EA ADD 15 MINS: Performed by: ORTHOPAEDIC SURGERY

## 2021-02-18 PROCEDURE — 37000008 HC ANESTHESIA 1ST 15 MINUTES: Performed by: ORTHOPAEDIC SURGERY

## 2021-02-18 PROCEDURE — 63600175 PHARM REV CODE 636 W HCPCS: Performed by: SPECIALIST

## 2021-02-18 PROCEDURE — 63600175 PHARM REV CODE 636 W HCPCS: Performed by: ORTHOPAEDIC SURGERY

## 2021-02-18 PROCEDURE — 36000711: Performed by: ORTHOPAEDIC SURGERY

## 2021-02-18 PROCEDURE — 64447 NJX AA&/STRD FEMORAL NRV IMG: CPT | Mod: 59 | Performed by: SPECIALIST

## 2021-02-18 PROCEDURE — 71000033 HC RECOVERY, INTIAL HOUR: Performed by: ORTHOPAEDIC SURGERY

## 2021-02-18 PROCEDURE — 25000003 PHARM REV CODE 250: Performed by: ORTHOPAEDIC SURGERY

## 2021-02-18 PROCEDURE — 71000039 HC RECOVERY, EACH ADD'L HOUR: Performed by: ORTHOPAEDIC SURGERY

## 2021-02-18 PROCEDURE — C9290 INJ, BUPIVACAINE LIPOSOME: HCPCS | Performed by: ORTHOPAEDIC SURGERY

## 2021-02-18 PROCEDURE — C1713 ANCHOR/SCREW BN/BN,TIS/BN: HCPCS | Performed by: ORTHOPAEDIC SURGERY

## 2021-02-18 PROCEDURE — C1776 JOINT DEVICE (IMPLANTABLE): HCPCS | Performed by: ORTHOPAEDIC SURGERY

## 2021-02-18 PROCEDURE — 76942 ECHO GUIDE FOR BIOPSY: CPT | Performed by: SPECIALIST

## 2021-02-18 PROCEDURE — 25000003 PHARM REV CODE 250: Performed by: ANESTHESIOLOGY

## 2021-02-18 PROCEDURE — 63600175 PHARM REV CODE 636 W HCPCS: Performed by: NURSE ANESTHETIST, CERTIFIED REGISTERED

## 2021-02-18 PROCEDURE — 94799 UNLISTED PULMONARY SVC/PX: CPT

## 2021-02-18 DEVICE — INSERT TIBIAL BEARING 10 X 71/: Type: IMPLANTABLE DEVICE | Site: KNEE | Status: FUNCTIONAL

## 2021-02-18 DEVICE — TIBIAL TRAY CRUCIATE 71MM: Type: IMPLANTABLE DEVICE | Site: KNEE | Status: FUNCTIONAL

## 2021-02-18 DEVICE — CEMENT BONE STD: Type: IMPLANTABLE DEVICE | Site: KNEE | Status: FUNCTIONAL

## 2021-02-18 DEVICE — PATELLA COMPONENT3PEG 34X8.5MM: Type: IMPLANTABLE DEVICE | Site: KNEE | Status: FUNCTIONAL

## 2021-02-18 DEVICE — COMP FEM POST STBL 65MM R: Type: IMPLANTABLE DEVICE | Site: KNEE | Status: FUNCTIONAL

## 2021-02-18 RX ORDER — FENTANYL CITRATE 50 UG/ML
100 INJECTION, SOLUTION INTRAMUSCULAR; INTRAVENOUS EVERY 5 MIN PRN
Status: COMPLETED | OUTPATIENT
Start: 2021-02-18 | End: 2021-02-18

## 2021-02-18 RX ORDER — NAPROXEN SODIUM 220 MG/1
81 TABLET, FILM COATED ORAL 2 TIMES DAILY
Status: DISCONTINUED | OUTPATIENT
Start: 2021-02-18 | End: 2021-02-18 | Stop reason: SDUPTHER

## 2021-02-18 RX ORDER — TRANEXAMIC ACID 100 MG/ML
INJECTION, SOLUTION INTRAVENOUS
Status: DISCONTINUED | OUTPATIENT
Start: 2021-02-18 | End: 2021-02-18 | Stop reason: HOSPADM

## 2021-02-18 RX ORDER — DEXTROSE MONOHYDRATE AND SODIUM CHLORIDE 5; .9 G/100ML; G/100ML
INJECTION, SOLUTION INTRAVENOUS CONTINUOUS
Status: DISCONTINUED | OUTPATIENT
Start: 2021-02-18 | End: 2021-02-19

## 2021-02-18 RX ORDER — MEPERIDINE HYDROCHLORIDE 25 MG/ML
12.5 INJECTION INTRAMUSCULAR; INTRAVENOUS; SUBCUTANEOUS ONCE AS NEEDED
Status: DISCONTINUED | OUTPATIENT
Start: 2021-02-18 | End: 2021-02-18 | Stop reason: HOSPADM

## 2021-02-18 RX ORDER — ONDANSETRON 8 MG/1
8 TABLET, ORALLY DISINTEGRATING ORAL EVERY 8 HOURS PRN
Status: DISCONTINUED | OUTPATIENT
Start: 2021-02-18 | End: 2021-02-19 | Stop reason: HOSPADM

## 2021-02-18 RX ORDER — MELATONIN 1 MG/ML
8 LIQUID (ML) ORAL NIGHTLY PRN
Status: DISCONTINUED | OUTPATIENT
Start: 2021-02-18 | End: 2021-02-19 | Stop reason: HOSPADM

## 2021-02-18 RX ORDER — PROPOFOL 10 MG/ML
VIAL (ML) INTRAVENOUS
Status: DISCONTINUED | OUTPATIENT
Start: 2021-02-18 | End: 2021-02-18

## 2021-02-18 RX ORDER — MONTELUKAST SODIUM 10 MG/1
10 TABLET ORAL NIGHTLY
Status: DISCONTINUED | OUTPATIENT
Start: 2021-02-18 | End: 2021-02-19 | Stop reason: HOSPADM

## 2021-02-18 RX ORDER — PROPOFOL 10 MG/ML
VIAL (ML) INTRAVENOUS CONTINUOUS PRN
Status: DISCONTINUED | OUTPATIENT
Start: 2021-02-18 | End: 2021-02-18

## 2021-02-18 RX ORDER — ONDANSETRON 2 MG/ML
4 INJECTION INTRAMUSCULAR; INTRAVENOUS DAILY PRN
Status: DISCONTINUED | OUTPATIENT
Start: 2021-02-18 | End: 2021-02-18 | Stop reason: HOSPADM

## 2021-02-18 RX ORDER — SODIUM CHLORIDE 9 MG/ML
INJECTION, SOLUTION INTRAVENOUS CONTINUOUS
Status: DISCONTINUED | OUTPATIENT
Start: 2021-02-18 | End: 2021-02-18

## 2021-02-18 RX ORDER — ACETAMINOPHEN 500 MG
1000 TABLET ORAL
Status: COMPLETED | OUTPATIENT
Start: 2021-02-18 | End: 2021-02-18

## 2021-02-18 RX ORDER — LIDOCAINE HYDROCHLORIDE 20 MG/ML
INJECTION INTRAVENOUS
Status: DISCONTINUED | OUTPATIENT
Start: 2021-02-18 | End: 2021-02-18

## 2021-02-18 RX ORDER — CLONAZEPAM 0.5 MG/1
0.5 TABLET ORAL ONCE
Status: COMPLETED | OUTPATIENT
Start: 2021-02-18 | End: 2021-02-18

## 2021-02-18 RX ORDER — AMLODIPINE BESYLATE 5 MG/1
5 TABLET ORAL DAILY
Status: DISCONTINUED | OUTPATIENT
Start: 2021-02-18 | End: 2021-02-18

## 2021-02-18 RX ORDER — NAPROXEN SODIUM 220 MG/1
81 TABLET, FILM COATED ORAL 2 TIMES DAILY
Status: DISCONTINUED | OUTPATIENT
Start: 2021-02-18 | End: 2021-02-19 | Stop reason: HOSPADM

## 2021-02-18 RX ORDER — ROSUVASTATIN CALCIUM 5 MG/1
5 TABLET, COATED ORAL DAILY
Status: DISCONTINUED | OUTPATIENT
Start: 2021-02-18 | End: 2021-02-19 | Stop reason: HOSPADM

## 2021-02-18 RX ORDER — DIPHENHYDRAMINE HYDROCHLORIDE 50 MG/ML
25 INJECTION INTRAMUSCULAR; INTRAVENOUS EVERY 6 HOURS PRN
Status: DISCONTINUED | OUTPATIENT
Start: 2021-02-18 | End: 2021-02-18 | Stop reason: HOSPADM

## 2021-02-18 RX ORDER — VALSARTAN AND HYDROCHLOROTHIAZIDE 160; 25 MG/1; MG/1
1 TABLET ORAL DAILY
Status: DISCONTINUED | OUTPATIENT
Start: 2021-02-18 | End: 2021-02-18

## 2021-02-18 RX ORDER — FAMOTIDINE 20 MG/1
20 TABLET, FILM COATED ORAL DAILY
Status: DISCONTINUED | OUTPATIENT
Start: 2021-02-18 | End: 2021-02-19 | Stop reason: HOSPADM

## 2021-02-18 RX ORDER — AMLODIPINE BESYLATE 5 MG/1
5 TABLET ORAL DAILY
Status: DISCONTINUED | OUTPATIENT
Start: 2021-02-19 | End: 2021-02-19 | Stop reason: HOSPADM

## 2021-02-18 RX ORDER — OXYBUTYNIN CHLORIDE 5 MG/1
5 TABLET ORAL ONCE
Status: COMPLETED | OUTPATIENT
Start: 2021-02-18 | End: 2021-02-18

## 2021-02-18 RX ORDER — MORPHINE SULFATE 2 MG/ML
2 INJECTION, SOLUTION INTRAMUSCULAR; INTRAVENOUS
Status: DISCONTINUED | OUTPATIENT
Start: 2021-02-18 | End: 2021-02-18

## 2021-02-18 RX ORDER — SODIUM CHLORIDE 0.9 % (FLUSH) 0.9 %
5 SYRINGE (ML) INJECTION
Status: DISCONTINUED | OUTPATIENT
Start: 2021-02-18 | End: 2021-02-19 | Stop reason: HOSPADM

## 2021-02-18 RX ORDER — THYROID 30 MG/1
120 TABLET ORAL DAILY
Status: DISCONTINUED | OUTPATIENT
Start: 2021-02-18 | End: 2021-02-19 | Stop reason: HOSPADM

## 2021-02-18 RX ORDER — VALSARTAN 80 MG/1
160 TABLET ORAL DAILY
Status: DISCONTINUED | OUTPATIENT
Start: 2021-02-19 | End: 2021-02-19 | Stop reason: HOSPADM

## 2021-02-18 RX ORDER — SODIUM CHLORIDE 0.9 % (FLUSH) 0.9 %
3 SYRINGE (ML) INJECTION
Status: DISCONTINUED | OUTPATIENT
Start: 2021-02-18 | End: 2021-02-19 | Stop reason: HOSPADM

## 2021-02-18 RX ORDER — CELECOXIB 200 MG/1
200 CAPSULE ORAL 2 TIMES DAILY
Status: DISCONTINUED | OUTPATIENT
Start: 2021-02-18 | End: 2021-02-19 | Stop reason: HOSPADM

## 2021-02-18 RX ORDER — CEFAZOLIN SODIUM 2 G/50ML
2 SOLUTION INTRAVENOUS
Status: COMPLETED | OUTPATIENT
Start: 2021-02-18 | End: 2021-02-19

## 2021-02-18 RX ORDER — EPHEDRINE SULFATE 50 MG/ML
INJECTION, SOLUTION INTRAVENOUS
Status: DISCONTINUED | OUTPATIENT
Start: 2021-02-18 | End: 2021-02-18

## 2021-02-18 RX ORDER — CLONAZEPAM 0.5 MG/1
0.5 TABLET ORAL 2 TIMES DAILY
Status: DISCONTINUED | OUTPATIENT
Start: 2021-02-18 | End: 2021-02-19 | Stop reason: HOSPADM

## 2021-02-18 RX ORDER — LIDOCAINE HYDROCHLORIDE 10 MG/ML
0.5 INJECTION, SOLUTION EPIDURAL; INFILTRATION; INTRACAUDAL; PERINEURAL ONCE
Status: DISCONTINUED | OUTPATIENT
Start: 2021-02-18 | End: 2021-02-18 | Stop reason: HOSPADM

## 2021-02-18 RX ORDER — SODIUM CHLORIDE, SODIUM LACTATE, POTASSIUM CHLORIDE, CALCIUM CHLORIDE 600; 310; 30; 20 MG/100ML; MG/100ML; MG/100ML; MG/100ML
INJECTION, SOLUTION INTRAVENOUS CONTINUOUS
Status: DISCONTINUED | OUTPATIENT
Start: 2021-02-18 | End: 2021-02-18

## 2021-02-18 RX ORDER — HYDROCODONE BITARTRATE AND ACETAMINOPHEN 10; 325 MG/1; MG/1
1 TABLET ORAL EVERY 4 HOURS PRN
Status: DISCONTINUED | OUTPATIENT
Start: 2021-02-18 | End: 2021-02-19 | Stop reason: HOSPADM

## 2021-02-18 RX ORDER — MUPIROCIN 20 MG/G
1 OINTMENT TOPICAL 2 TIMES DAILY
Status: DISCONTINUED | OUTPATIENT
Start: 2021-02-18 | End: 2021-02-19 | Stop reason: HOSPADM

## 2021-02-18 RX ORDER — METOPROLOL SUCCINATE 25 MG/1
25 TABLET, EXTENDED RELEASE ORAL 2 TIMES DAILY
Status: DISCONTINUED | OUTPATIENT
Start: 2021-02-18 | End: 2021-02-19 | Stop reason: HOSPADM

## 2021-02-18 RX ORDER — HYDROMORPHONE HYDROCHLORIDE 2 MG/ML
0.4 INJECTION, SOLUTION INTRAMUSCULAR; INTRAVENOUS; SUBCUTANEOUS EVERY 5 MIN PRN
Status: DISCONTINUED | OUTPATIENT
Start: 2021-02-18 | End: 2021-02-18 | Stop reason: HOSPADM

## 2021-02-18 RX ORDER — ROPIVACAINE HYDROCHLORIDE 5 MG/ML
INJECTION, SOLUTION EPIDURAL; INFILTRATION; PERINEURAL
Status: COMPLETED | OUTPATIENT
Start: 2021-02-18 | End: 2021-02-18

## 2021-02-18 RX ORDER — CEFAZOLIN SODIUM 1 G/3ML
2 INJECTION, POWDER, FOR SOLUTION INTRAMUSCULAR; INTRAVENOUS
Status: COMPLETED | OUTPATIENT
Start: 2021-02-18 | End: 2021-02-18

## 2021-02-18 RX ORDER — VALSARTAN 80 MG/1
160 TABLET ORAL DAILY
Status: DISCONTINUED | OUTPATIENT
Start: 2021-02-18 | End: 2021-02-18

## 2021-02-18 RX ORDER — HYDROCODONE BITARTRATE AND ACETAMINOPHEN 5; 325 MG/1; MG/1
1 TABLET ORAL EVERY 4 HOURS PRN
Status: DISCONTINUED | OUTPATIENT
Start: 2021-02-18 | End: 2021-02-19 | Stop reason: HOSPADM

## 2021-02-18 RX ORDER — MIDAZOLAM HYDROCHLORIDE 1 MG/ML
2 INJECTION INTRAMUSCULAR; INTRAVENOUS
Status: DISCONTINUED | OUTPATIENT
Start: 2021-02-18 | End: 2021-02-18 | Stop reason: HOSPADM

## 2021-02-18 RX ORDER — POLYETHYLENE GLYCOL 3350 17 G/17G
17 POWDER, FOR SOLUTION ORAL DAILY
Status: DISCONTINUED | OUTPATIENT
Start: 2021-02-18 | End: 2021-02-19 | Stop reason: HOSPADM

## 2021-02-18 RX ORDER — MORPHINE SULFATE 4 MG/ML
4 INJECTION, SOLUTION INTRAMUSCULAR; INTRAVENOUS
Status: DISCONTINUED | OUTPATIENT
Start: 2021-02-18 | End: 2021-02-19 | Stop reason: HOSPADM

## 2021-02-18 RX ORDER — OXYCODONE HYDROCHLORIDE 5 MG/1
5 TABLET ORAL
Status: DISCONTINUED | OUTPATIENT
Start: 2021-02-18 | End: 2021-02-18 | Stop reason: HOSPADM

## 2021-02-18 RX ORDER — ONDANSETRON 2 MG/ML
INJECTION INTRAMUSCULAR; INTRAVENOUS
Status: DISCONTINUED | OUTPATIENT
Start: 2021-02-18 | End: 2021-02-18

## 2021-02-18 RX ORDER — HYDROCHLOROTHIAZIDE 25 MG/1
25 TABLET ORAL DAILY
Status: DISCONTINUED | OUTPATIENT
Start: 2021-02-18 | End: 2021-02-18

## 2021-02-18 RX ADMIN — PROPOFOL 20 MG: 10 INJECTION, EMULSION INTRAVENOUS at 10:02

## 2021-02-18 RX ADMIN — EPHEDRINE SULFATE 10 MG: 50 INJECTION INTRAVENOUS at 10:02

## 2021-02-18 RX ADMIN — ONDANSETRON HYDROCHLORIDE 4 MG: 2 INJECTION INTRAMUSCULAR; INTRAVENOUS at 10:02

## 2021-02-18 RX ADMIN — ROPIVACAINE HYDROCHLORIDE 3 ML: 5 INJECTION, SOLUTION EPIDURAL; INFILTRATION; PERINEURAL at 09:02

## 2021-02-18 RX ADMIN — HYDROCODONE BITARTRATE AND ACETAMINOPHEN 1 TABLET: 10; 325 TABLET ORAL at 08:02

## 2021-02-18 RX ADMIN — SODIUM CHLORIDE, SODIUM LACTATE, POTASSIUM CHLORIDE, AND CALCIUM CHLORIDE: 600; 310; 30; 20 INJECTION, SOLUTION INTRAVENOUS at 10:02

## 2021-02-18 RX ADMIN — LIDOCAINE HYDROCHLORIDE 50 MG: 20 INJECTION, SOLUTION INTRAVENOUS at 09:02

## 2021-02-18 RX ADMIN — MONTELUKAST 10 MG: 10 TABLET, FILM COATED ORAL at 08:02

## 2021-02-18 RX ADMIN — CELECOXIB 200 MG: 200 CAPSULE ORAL at 08:02

## 2021-02-18 RX ADMIN — GLYCOPYRROLATE 0.2 MCG: 0.2 INJECTION, SOLUTION INTRAMUSCULAR; INTRAVITREAL at 09:02

## 2021-02-18 RX ADMIN — MORPHINE SULFATE 4 MG: 4 INJECTION, SOLUTION INTRAMUSCULAR; INTRAVENOUS at 05:02

## 2021-02-18 RX ADMIN — PROPOFOL 20 MG: 10 INJECTION, EMULSION INTRAVENOUS at 09:02

## 2021-02-18 RX ADMIN — MUPIROCIN 1 G: 20 OINTMENT TOPICAL at 08:02

## 2021-02-18 RX ADMIN — FAMOTIDINE 20 MG: 20 TABLET ORAL at 02:02

## 2021-02-18 RX ADMIN — ROSUVASTATIN CALCIUM 5 MG: 5 TABLET, COATED ORAL at 02:02

## 2021-02-18 RX ADMIN — PROPOFOL 75 MCG/KG/MIN: 10 INJECTION, EMULSION INTRAVENOUS at 09:02

## 2021-02-18 RX ADMIN — FENTANYL CITRATE 50 MCG: 50 INJECTION, SOLUTION INTRAMUSCULAR; INTRAVENOUS at 08:02

## 2021-02-18 RX ADMIN — PROPOFOL 30 MG: 10 INJECTION, EMULSION INTRAVENOUS at 09:02

## 2021-02-18 RX ADMIN — MORPHINE SULFATE 4 MG: 4 INJECTION, SOLUTION INTRAMUSCULAR; INTRAVENOUS at 02:02

## 2021-02-18 RX ADMIN — CLONAZEPAM 0.5 MG: 0.5 TABLET ORAL at 08:02

## 2021-02-18 RX ADMIN — SODIUM CHLORIDE, SODIUM LACTATE, POTASSIUM CHLORIDE, AND CALCIUM CHLORIDE: 600; 310; 30; 20 INJECTION, SOLUTION INTRAVENOUS at 08:02

## 2021-02-18 RX ADMIN — CLONAZEPAM 0.5 MG: 0.5 TABLET ORAL at 05:02

## 2021-02-18 RX ADMIN — DEXTROSE AND SODIUM CHLORIDE: 5; .9 INJECTION, SOLUTION INTRAVENOUS at 02:02

## 2021-02-18 RX ADMIN — OXYBUTYNIN CHLORIDE 5 MG: 5 TABLET ORAL at 02:02

## 2021-02-18 RX ADMIN — METOPROLOL SUCCINATE 25 MG: 25 TABLET, EXTENDED RELEASE ORAL at 08:02

## 2021-02-18 RX ADMIN — CEFAZOLIN 1 G: 330 INJECTION, POWDER, FOR SOLUTION INTRAMUSCULAR; INTRAVENOUS at 09:02

## 2021-02-18 RX ADMIN — ACETAMINOPHEN 1000 MG: 500 TABLET ORAL at 07:02

## 2021-02-18 RX ADMIN — POLYETHYLENE GLYCOL 3350 17 G: 17 POWDER, FOR SOLUTION ORAL at 02:02

## 2021-02-18 RX ADMIN — EPHEDRINE SULFATE 10 MG: 50 INJECTION INTRAVENOUS at 09:02

## 2021-02-18 RX ADMIN — ROPIVACAINE HYDROCHLORIDE 25 ML: 5 INJECTION, SOLUTION EPIDURAL; INFILTRATION; PERINEURAL at 08:02

## 2021-02-18 RX ADMIN — HYDROCODONE BITARTRATE AND ACETAMINOPHEN 1 TABLET: 10; 325 TABLET ORAL at 03:02

## 2021-02-18 RX ADMIN — CEFAZOLIN SODIUM 2 G: 2 SOLUTION INTRAVENOUS at 05:02

## 2021-02-18 RX ADMIN — ASPIRIN 81 MG: 81 TABLET, CHEWABLE ORAL at 08:02

## 2021-02-18 RX ADMIN — FENTANYL CITRATE 50 MCG: 50 INJECTION, SOLUTION INTRAMUSCULAR; INTRAVENOUS at 09:02

## 2021-02-19 VITALS
HEART RATE: 64 BPM | SYSTOLIC BLOOD PRESSURE: 172 MMHG | WEIGHT: 188 LBS | HEIGHT: 65 IN | BODY MASS INDEX: 31.32 KG/M2 | OXYGEN SATURATION: 94 % | RESPIRATION RATE: 16 BRPM | TEMPERATURE: 98 F | DIASTOLIC BLOOD PRESSURE: 79 MMHG

## 2021-02-19 PROBLEM — M17.9 OA (OSTEOARTHRITIS) OF KNEE: Status: RESOLVED | Noted: 2021-02-18 | Resolved: 2021-02-19

## 2021-02-19 LAB
ANION GAP SERPL CALC-SCNC: 14 MMOL/L (ref 8–16)
BASOPHILS # BLD AUTO: 0.03 K/UL (ref 0–0.2)
BASOPHILS NFR BLD: 0.5 % (ref 0–1.9)
BUN SERPL-MCNC: 5 MG/DL (ref 8–23)
CALCIUM SERPL-MCNC: 8.1 MG/DL (ref 8.7–10.5)
CHLORIDE SERPL-SCNC: 94 MMOL/L (ref 95–110)
CO2 SERPL-SCNC: 21 MMOL/L (ref 23–29)
CREAT SERPL-MCNC: 0.7 MG/DL (ref 0.5–1.4)
DIFFERENTIAL METHOD: ABNORMAL
EOSINOPHIL # BLD AUTO: 0.1 K/UL (ref 0–0.5)
EOSINOPHIL NFR BLD: 1.9 % (ref 0–8)
ERYTHROCYTE [DISTWIDTH] IN BLOOD BY AUTOMATED COUNT: 13.6 % (ref 11.5–14.5)
EST. GFR  (AFRICAN AMERICAN): >60 ML/MIN/1.73 M^2
EST. GFR  (NON AFRICAN AMERICAN): >60 ML/MIN/1.73 M^2
GLUCOSE SERPL-MCNC: 115 MG/DL (ref 70–110)
HCT VFR BLD AUTO: 33.9 % (ref 37–48.5)
HGB BLD-MCNC: 11.2 G/DL (ref 12–16)
IMM GRANULOCYTES # BLD AUTO: 0.02 K/UL (ref 0–0.04)
IMM GRANULOCYTES NFR BLD AUTO: 0.3 % (ref 0–0.5)
LYMPHOCYTES # BLD AUTO: 0.9 K/UL (ref 1–4.8)
LYMPHOCYTES NFR BLD: 13.8 % (ref 18–48)
MCH RBC QN AUTO: 31.5 PG (ref 27–31)
MCHC RBC AUTO-ENTMCNC: 33 G/DL (ref 32–36)
MCV RBC AUTO: 96 FL (ref 82–98)
MONOCYTES # BLD AUTO: 1 K/UL (ref 0.3–1)
MONOCYTES NFR BLD: 15.9 % (ref 4–15)
NEUTROPHILS # BLD AUTO: 4.3 K/UL (ref 1.8–7.7)
NEUTROPHILS NFR BLD: 67.6 % (ref 38–73)
NRBC BLD-RTO: 0 /100 WBC
PLATELET # BLD AUTO: 141 K/UL (ref 150–350)
PMV BLD AUTO: 10.4 FL (ref 9.2–12.9)
POTASSIUM SERPL-SCNC: 3.7 MMOL/L (ref 3.5–5.1)
RBC # BLD AUTO: 3.55 M/UL (ref 4–5.4)
SODIUM SERPL-SCNC: 129 MMOL/L (ref 136–145)
WBC # BLD AUTO: 6.37 K/UL (ref 3.9–12.7)

## 2021-02-19 PROCEDURE — 36415 COLL VENOUS BLD VENIPUNCTURE: CPT

## 2021-02-19 PROCEDURE — 25000003 PHARM REV CODE 250: Performed by: NURSE PRACTITIONER

## 2021-02-19 PROCEDURE — 97530 THERAPEUTIC ACTIVITIES: CPT

## 2021-02-19 PROCEDURE — 85025 COMPLETE CBC W/AUTO DIFF WBC: CPT

## 2021-02-19 PROCEDURE — 97116 GAIT TRAINING THERAPY: CPT

## 2021-02-19 PROCEDURE — 97165 OT EVAL LOW COMPLEX 30 MIN: CPT

## 2021-02-19 PROCEDURE — 25000003 PHARM REV CODE 250: Performed by: ORTHOPAEDIC SURGERY

## 2021-02-19 PROCEDURE — 97110 THERAPEUTIC EXERCISES: CPT

## 2021-02-19 PROCEDURE — 80048 BASIC METABOLIC PNL TOTAL CA: CPT

## 2021-02-19 PROCEDURE — 97535 SELF CARE MNGMENT TRAINING: CPT

## 2021-02-19 PROCEDURE — 63600175 PHARM REV CODE 636 W HCPCS: Performed by: ORTHOPAEDIC SURGERY

## 2021-02-19 RX ORDER — HYDROCODONE BITARTRATE AND ACETAMINOPHEN 10; 325 MG/1; MG/1
1 TABLET ORAL EVERY 6 HOURS PRN
Qty: 40 TABLET | Refills: 0 | Status: SHIPPED | OUTPATIENT
Start: 2021-02-19

## 2021-02-19 RX ADMIN — AMLODIPINE BESYLATE 5 MG: 5 TABLET ORAL at 09:02

## 2021-02-19 RX ADMIN — HYDROCODONE BITARTRATE AND ACETAMINOPHEN 1 TABLET: 10; 325 TABLET ORAL at 01:02

## 2021-02-19 RX ADMIN — ASPIRIN 81 MG: 81 TABLET, CHEWABLE ORAL at 09:02

## 2021-02-19 RX ADMIN — HYDROCODONE BITARTRATE AND ACETAMINOPHEN 1 TABLET: 10; 325 TABLET ORAL at 09:02

## 2021-02-19 RX ADMIN — FAMOTIDINE 20 MG: 20 TABLET ORAL at 09:02

## 2021-02-19 RX ADMIN — SODIUM CHLORIDE TAB 1 GM 1 G: 1 TAB at 09:02

## 2021-02-19 RX ADMIN — CEFAZOLIN SODIUM 2 G: 2 SOLUTION INTRAVENOUS at 01:02

## 2021-02-19 RX ADMIN — METOPROLOL SUCCINATE 25 MG: 25 TABLET, EXTENDED RELEASE ORAL at 09:02

## 2021-02-19 RX ADMIN — MUPIROCIN 1 G: 20 OINTMENT TOPICAL at 09:02

## 2021-02-19 RX ADMIN — CLONAZEPAM 0.5 MG: 0.5 TABLET ORAL at 09:02

## 2021-02-19 RX ADMIN — VALSARTAN 160 MG: 80 TABLET, FILM COATED ORAL at 09:02

## 2021-02-19 RX ADMIN — DEXTROSE AND SODIUM CHLORIDE: 5; .9 INJECTION, SOLUTION INTRAVENOUS at 01:02

## 2021-02-19 RX ADMIN — ROSUVASTATIN CALCIUM 5 MG: 5 TABLET, COATED ORAL at 09:02

## 2021-02-19 RX ADMIN — HYDROCODONE BITARTRATE AND ACETAMINOPHEN 1 TABLET: 10; 325 TABLET ORAL at 06:02

## 2021-02-19 RX ADMIN — LEVOTHYROXINE, LIOTHYRONINE 120 MG: 19; 4.5 TABLET ORAL at 09:02

## 2021-02-19 RX ADMIN — CELECOXIB 200 MG: 200 CAPSULE ORAL at 09:02

## 2022-03-24 NOTE — DISCHARGE INSTRUCTIONS
PRE-ADMIT TESTING -  [phone]    [address]  [place] [facility]          Your surgery has been scheduled at [facility]. We are pleased to have the opportunity to serve you. For Further Information please call [phone].    On the day of surgery please report to the Information Desk on the 1st floor.    · CONTACT YOUR PHYSICIAN'S OFFICE THE DAY PRIOR TO YOUR SURGERY TO OBTAIN YOUR ARRIVAL TIME.     · The evening before surgery do not eat anything after 9 p.m. ( this includes hard candy, chewing gum and mints).  You may only have GATORADE, POWERADE AND WATER  from 9 p.m. until you leave your home.   DO NOT DRINK ANY LIQUIDS ON THE WAY TO THE HOSPITAL.      SPECIAL MEDICATION INSTRUCTIONS: TAKE medications checked off by the Anesthesiologist on your Medication List.    Angiogram Patients: Take medications as instructed by your physician, including aspirin.     Surgery Patients:    If you take ASPIRIN - Your PHYSICIAN/SURGEON will need to inform you IF/OR when you need to stop taking aspirin prior to your surgery.     Do Not take any medications containing IBUPROFEN.  Do Not Wear any make-up or dark nail polish   (especially eye make-up) to surgery. If you come to surgery with makeup on you will be required to remove the makeup or nail polish.    Do not shave your surgical area at least 5 days prior to your surgery. The surgical prep will be performed at the hospital according to Infection Control regulations.    Leave all valuables at home.   Do Not wear any jewelry or watches, including any metal in body piercings.  Contact Lens must be removed before surgery. Either do not wear the contact lens or bring a case and solution for storage.  Please bring a container for eyeglasses or dentures as required.  Bring any paperwork your physician has provided, such as consent forms,  history and physicals, doctor's orders, etc.   Bring comfortable clothes that are loose fitting to wear upon  discharge. Take into consideration the type of surgery being performed.  Maintain your diet as advised per your physician the day prior to surgery.      Adequate rest the night before surgery is advised.   Park in the Parking lot behind the hospital or in the [facility] across the street from the parking lot. Parking is complimentary.  If you will be discharged the same day as your procedure, please arrange for a responsible adult to drive you home or to accompany you if traveling by taxi.   YOU WILL NOT BE PERMITTED TO DRIVE OR TO LEAVE THE HOSPITAL ALONE AFTER SURGERY.   It is strongly recommended that you arrange for someone to remain with you for the first 24 hrs following your surgery.       Thank you for your cooperation.  The Staff of [facility].        Bathing Instructions                                                                 Please shower the evening before and morning of your procedure with    ANTIBACTERIAL SOAP. ( DIAL, etc )  Concentrate on the surgical area   for at least 3 minutes and rinse completely. Dry off as usual.   Do not use any deodorant, powder, body lotions, perfume, after shave or    cologne.                                             [Annual] : an annual visit.

## 2023-03-31 ENCOUNTER — HOSPITAL ENCOUNTER (OUTPATIENT)
Dept: CARDIOLOGY | Facility: OTHER | Age: 83
Discharge: HOME | End: 2023-03-31
Attending: FAMILY MEDICINE
Payer: MEDICARE

## 2023-03-31 DIAGNOSIS — Z01.810 ENCOUNTER FOR PREPROCEDURAL CARDIOVASCULAR EXAMINATION: ICD-10-CM

## 2023-03-31 PROCEDURE — 93005 ELECTROCARDIOGRAM TRACING: CPT

## 2023-03-31 PROCEDURE — 93010 ELECTROCARDIOGRAM REPORT: CPT | Mod: ,,, | Performed by: INTERNAL MEDICINE

## 2023-03-31 PROCEDURE — 93010 EKG 12-LEAD: ICD-10-PCS | Mod: ,,, | Performed by: INTERNAL MEDICINE

## 2023-07-20 ENCOUNTER — APPOINTMENT (RX ONLY)
Dept: URBAN - NONMETROPOLITAN AREA CLINIC 15 | Facility: CLINIC | Age: 83
Setting detail: DERMATOLOGY
End: 2023-07-20

## 2023-07-20 DIAGNOSIS — L21.8 OTHER SEBORRHEIC DERMATITIS: ICD-10-CM

## 2023-07-20 DIAGNOSIS — D22 MELANOCYTIC NEVI: ICD-10-CM

## 2023-07-20 DIAGNOSIS — L82.1 OTHER SEBORRHEIC KERATOSIS: ICD-10-CM

## 2023-07-20 DIAGNOSIS — L57.8 OTHER SKIN CHANGES DUE TO CHRONIC EXPOSURE TO NONIONIZING RADIATION: ICD-10-CM

## 2023-07-20 DIAGNOSIS — D18.0 HEMANGIOMA: ICD-10-CM

## 2023-07-20 DIAGNOSIS — L24.4 IRRITANT CONTACT DERMATITIS DUE TO DRUGS IN CONTACT WITH SKIN: ICD-10-CM | Status: RESOLVING

## 2023-07-20 PROBLEM — D22.5 MELANOCYTIC NEVI OF TRUNK: Status: ACTIVE | Noted: 2023-07-20

## 2023-07-20 PROBLEM — D18.01 HEMANGIOMA OF SKIN AND SUBCUTANEOUS TISSUE: Status: ACTIVE | Noted: 2023-07-20

## 2023-07-20 PROCEDURE — ? FULL BODY SKIN EXAM - DECLINED

## 2023-07-20 PROCEDURE — ? COUNSELING

## 2023-07-20 PROCEDURE — ? TREATMENT REGIMEN

## 2023-07-20 PROCEDURE — ? PRESCRIPTION

## 2023-07-20 PROCEDURE — 99203 OFFICE O/P NEW LOW 30 MIN: CPT

## 2023-07-20 RX ORDER — FLUOCINONIDE 0.5 MG/ML
SOLUTION TOPICAL
Qty: 60 | Refills: 4 | Status: ERX | COMMUNITY
Start: 2023-07-20

## 2023-07-20 RX ADMIN — FLUOCINONIDE 1: 0.5 SOLUTION TOPICAL at 00:00

## 2023-07-20 ASSESSMENT — LOCATION SIMPLE DESCRIPTION DERM
LOCATION SIMPLE: RIGHT TEMPLE
LOCATION SIMPLE: ABDOMEN
LOCATION SIMPLE: UPPER BACK
LOCATION SIMPLE: LOWER BACK
LOCATION SIMPLE: LEFT TEMPLE
LOCATION SIMPLE: POSTERIOR SCALP
LOCATION SIMPLE: SUPERIOR FOREHEAD
LOCATION SIMPLE: LEFT FOREHEAD

## 2023-07-20 ASSESSMENT — LOCATION DETAILED DESCRIPTION DERM
LOCATION DETAILED: PERIUMBILICAL SKIN
LOCATION DETAILED: RIGHT SUPERIOR OCCIPITAL SCALP
LOCATION DETAILED: SUPERIOR LUMBAR SPINE
LOCATION DETAILED: SUPERIOR MID FOREHEAD
LOCATION DETAILED: RIGHT CENTRAL TEMPLE
LOCATION DETAILED: LEFT CENTRAL TEMPLE
LOCATION DETAILED: INFERIOR THORACIC SPINE
LOCATION DETAILED: LEFT SUPERIOR MEDIAL FOREHEAD

## 2023-07-20 ASSESSMENT — LOCATION ZONE DERM
LOCATION ZONE: TRUNK
LOCATION ZONE: SCALP
LOCATION ZONE: FACE

## 2023-07-20 NOTE — PROCEDURE: TREATMENT REGIMEN
Detail Level: Simple
Otc Regimen: Recommended SPF 30 or greater.
Continue Regimen: Patient was given a shampoo from her PCP, Florence Hoyos. Patient will continue to use as prescribed.
Initiate Treatment: fluocinonide 0.05 % topical solution \\nQuantity: 60.0 ml  Days Supply: 30\\nSig: Apply BID x 2 weeks max to itchy scalp
Plan: Patient just finished cream that she thinks is fluorouracil on her forehead about 3 weeks ago.  Unclear if patient is in a state of healing after treatment or not.  Will recheck in 6-8 weeks and cryo if persist.

## 2023-09-14 ENCOUNTER — APPOINTMENT (RX ONLY)
Dept: URBAN - NONMETROPOLITAN AREA CLINIC 15 | Facility: CLINIC | Age: 83
Setting detail: DERMATOLOGY
End: 2023-09-14

## 2023-09-14 DIAGNOSIS — L57.0 ACTINIC KERATOSIS: ICD-10-CM

## 2023-09-14 DIAGNOSIS — L57.8 OTHER SKIN CHANGES DUE TO CHRONIC EXPOSURE TO NONIONIZING RADIATION: ICD-10-CM

## 2023-09-14 DIAGNOSIS — L82.1 OTHER SEBORRHEIC KERATOSIS: ICD-10-CM

## 2023-09-14 PROCEDURE — 99213 OFFICE O/P EST LOW 20 MIN: CPT

## 2023-09-14 PROCEDURE — ? COUNSELING

## 2023-09-14 PROCEDURE — ? TREATMENT REGIMEN

## 2023-09-14 ASSESSMENT — LOCATION SIMPLE DESCRIPTION DERM
LOCATION SIMPLE: INFERIOR FOREHEAD
LOCATION SIMPLE: RIGHT TEMPLE
LOCATION SIMPLE: POSTERIOR NECK
LOCATION SIMPLE: RIGHT FOREHEAD

## 2023-09-14 ASSESSMENT — LOCATION ZONE DERM
LOCATION ZONE: NECK
LOCATION ZONE: FACE

## 2023-09-14 ASSESSMENT — LOCATION DETAILED DESCRIPTION DERM
LOCATION DETAILED: RIGHT CENTRAL TEMPLE
LOCATION DETAILED: INFERIOR MID FOREHEAD
LOCATION DETAILED: LEFT MEDIAL TRAPEZIAL NECK
LOCATION DETAILED: RIGHT MEDIAL TRAPEZIAL NECK
LOCATION DETAILED: RIGHT SUPERIOR MEDIAL FOREHEAD

## 2023-09-14 NOTE — PROCEDURE: TREATMENT REGIMEN
Plan: Patient has 5FU at home and will apply to single AK on R temple bid x 2 weeks. The rest of the facial skin shows smooth skin where AKs had been. Patient agrees to call office and make appointment if lesion persists after using 5FU on temple (declines return visit to recheck).
Detail Level: Simple

## 2024-07-15 ENCOUNTER — APPOINTMENT (RX ONLY)
Dept: URBAN - NONMETROPOLITAN AREA CLINIC 22 | Facility: CLINIC | Age: 84
Setting detail: DERMATOLOGY
End: 2024-07-15

## 2024-07-15 DIAGNOSIS — D18.0 HEMANGIOMA: ICD-10-CM

## 2024-07-15 DIAGNOSIS — D22 MELANOCYTIC NEVI: ICD-10-CM

## 2024-07-15 DIAGNOSIS — L57.8 OTHER SKIN CHANGES DUE TO CHRONIC EXPOSURE TO NONIONIZING RADIATION: ICD-10-CM

## 2024-07-15 DIAGNOSIS — L82.1 OTHER SEBORRHEIC KERATOSIS: ICD-10-CM

## 2024-07-15 PROBLEM — D18.01 HEMANGIOMA OF SKIN AND SUBCUTANEOUS TISSUE: Status: ACTIVE | Noted: 2024-07-15

## 2024-07-15 PROBLEM — D22.5 MELANOCYTIC NEVI OF TRUNK: Status: ACTIVE | Noted: 2024-07-15

## 2024-07-15 PROCEDURE — 99213 OFFICE O/P EST LOW 20 MIN: CPT

## 2024-07-15 PROCEDURE — ? COUNSELING

## 2024-07-15 PROCEDURE — ? TREATMENT REGIMEN

## 2024-07-15 PROCEDURE — ? FULL BODY SKIN EXAM

## 2024-07-15 ASSESSMENT — LOCATION ZONE DERM
LOCATION ZONE: TRUNK
LOCATION ZONE: NOSE

## 2024-07-15 ASSESSMENT — LOCATION DETAILED DESCRIPTION DERM
LOCATION DETAILED: PERIUMBILICAL SKIN
LOCATION DETAILED: INFERIOR THORACIC SPINE
LOCATION DETAILED: SUPERIOR LUMBAR SPINE
LOCATION DETAILED: NASAL DORSUM

## 2024-07-15 ASSESSMENT — LOCATION SIMPLE DESCRIPTION DERM
LOCATION SIMPLE: LOWER BACK
LOCATION SIMPLE: UPPER BACK
LOCATION SIMPLE: ABDOMEN
LOCATION SIMPLE: NOSE

## 2025-04-28 DIAGNOSIS — C18.9 MALIGNANT NEOPLASM OF COLON, UNSPECIFIED PART OF COLON: Primary | ICD-10-CM

## 2025-04-28 DIAGNOSIS — Z08 ENCOUNTER FOR FOLLOW-UP EXAMINATION AFTER COMPLETED TREATMENT FOR MALIGNANT NEOPLASM: ICD-10-CM

## 2025-07-16 ENCOUNTER — APPOINTMENT (OUTPATIENT)
Dept: URBAN - NONMETROPOLITAN AREA CLINIC 22 | Facility: CLINIC | Age: 85
Setting detail: DERMATOLOGY
End: 2025-07-16

## 2025-07-16 DIAGNOSIS — D22 MELANOCYTIC NEVI: ICD-10-CM

## 2025-07-16 DIAGNOSIS — L57.8 OTHER SKIN CHANGES DUE TO CHRONIC EXPOSURE TO NONIONIZING RADIATION: ICD-10-CM

## 2025-07-16 DIAGNOSIS — D18.0 HEMANGIOMA: ICD-10-CM

## 2025-07-16 DIAGNOSIS — L82.1 OTHER SEBORRHEIC KERATOSIS: ICD-10-CM

## 2025-07-16 PROBLEM — D22.5 MELANOCYTIC NEVI OF TRUNK: Status: ACTIVE | Noted: 2025-07-16

## 2025-07-16 PROBLEM — D18.01 HEMANGIOMA OF SKIN AND SUBCUTANEOUS TISSUE: Status: ACTIVE | Noted: 2025-07-16

## 2025-07-16 PROCEDURE — ? COUNSELING

## 2025-07-16 PROCEDURE — ? TREATMENT REGIMEN

## 2025-07-16 PROCEDURE — ? FULL BODY SKIN EXAM

## 2025-07-16 ASSESSMENT — LOCATION ZONE DERM
LOCATION ZONE: TRUNK
LOCATION ZONE: NOSE

## 2025-07-16 ASSESSMENT — LOCATION SIMPLE DESCRIPTION DERM
LOCATION SIMPLE: UPPER BACK
LOCATION SIMPLE: LOWER BACK
LOCATION SIMPLE: NOSE
LOCATION SIMPLE: ABDOMEN

## 2025-07-16 ASSESSMENT — LOCATION DETAILED DESCRIPTION DERM
LOCATION DETAILED: PERIUMBILICAL SKIN
LOCATION DETAILED: NASAL DORSUM
LOCATION DETAILED: INFERIOR THORACIC SPINE
LOCATION DETAILED: SUPERIOR LUMBAR SPINE

## (undated) DEVICE — STOCKINETTE TUBULAR 2PL 6 X 4

## (undated) DEVICE — COVER BACK TABLE 72X21

## (undated) DEVICE — GLOVE BIOGEL SKINSENSE PI 7.0

## (undated) DEVICE — PULSAVAC ZIMMER

## (undated) DEVICE — SPONGE COTTON TRAY 4X4IN

## (undated) DEVICE — DRESSING N ADH OIL EMUL 3X8

## (undated) DEVICE — DRAPE SURG W/TWL 17 5/8X23

## (undated) DEVICE — SUT VICRYL 2-0 8-18 CP-2

## (undated) DEVICE — STAPLER SKIN ROTATING HEAD

## (undated) DEVICE — MASK FLYTE HOOD PEEL AWAY

## (undated) DEVICE — SUT VICRYL+ 1 CTX 18IN VIOL

## (undated) DEVICE — Device

## (undated) DEVICE — SEE MEDLINE ITEM 152523

## (undated) DEVICE — UNDERGLOVE BIOGEL PI SZ 6.5 LF

## (undated) DEVICE — PAD ABD 8X10 STERILE

## (undated) DEVICE — SEE MEDLINE ITEM 153151

## (undated) DEVICE — GOWN B1 X-LG X-LONG

## (undated) DEVICE — BLANKET HYPER ADULT 24X60IN

## (undated) DEVICE — GLOVE BIOGEL SKINSENSE PI 8.5

## (undated) DEVICE — BLADE SURG STAINLESS STEEL #15

## (undated) DEVICE — SYR 50CC LL

## (undated) DEVICE — SOL 9P NACL IRR PIC IL

## (undated) DEVICE — DRAPE INCISE IOBAN 2 23X17IN

## (undated) DEVICE — TOURNIQUET SB QC SP 34X4IN

## (undated) DEVICE — GLOVE BIOGEL SKINSENSE PI 6.5

## (undated) DEVICE — SEE MEDLINE ITEM 146298

## (undated) DEVICE — PAD CAST SPECIALIST STRL 6

## (undated) DEVICE — GAUZE SPONGE 4X4 12PLY

## (undated) DEVICE — SOL IRR NACL .9% 1000CC

## (undated) DEVICE — SEE L#120831

## (undated) DEVICE — TRAY FOLEY 16FR INFECTION CONT

## (undated) DEVICE — DRAPE STERI U-SHAPED 47X51IN

## (undated) DEVICE — TOURNIQUET SB QC DP 34X4IN

## (undated) DEVICE — NDL 18GA X1 1/2 REG BEVEL

## (undated) DEVICE — GAUZE SPONGE 4'X4 12 PLY

## (undated) DEVICE — APPLICATOR CHLORAPREP ORN 26ML

## (undated) DEVICE — BLADE SAW SAG 25.40MM X 1.27MM

## (undated) DEVICE — ELECTRODE REM PLYHSV RETURN 9

## (undated) DEVICE — HOOD T-5 TEAR AWAY STERILE

## (undated) DEVICE — BANDAGE ACE ELASTIC 6"

## (undated) DEVICE — KIT TOTAL HIP OPTIVAC

## (undated) DEVICE — SEE MEDLINE ITEM 157117

## (undated) DEVICE — PADDING CAST SPECIALIST 6X4YD